# Patient Record
Sex: MALE | Race: WHITE | NOT HISPANIC OR LATINO
[De-identification: names, ages, dates, MRNs, and addresses within clinical notes are randomized per-mention and may not be internally consistent; named-entity substitution may affect disease eponyms.]

---

## 2017-07-04 ENCOUNTER — TRANSCRIPTION ENCOUNTER (OUTPATIENT)
Age: 62
End: 2017-07-04

## 2019-10-04 ENCOUNTER — APPOINTMENT (OUTPATIENT)
Dept: INTERNAL MEDICINE | Facility: CLINIC | Age: 64
End: 2019-10-04
Payer: MEDICAID

## 2019-10-04 VITALS
BODY MASS INDEX: 22.22 KG/M2 | SYSTOLIC BLOOD PRESSURE: 108 MMHG | HEIGHT: 69 IN | WEIGHT: 150 LBS | OXYGEN SATURATION: 97 % | DIASTOLIC BLOOD PRESSURE: 60 MMHG | HEART RATE: 60 BPM | RESPIRATION RATE: 16 BRPM | TEMPERATURE: 97.8 F

## 2019-10-04 DIAGNOSIS — K01.1 IMPACTED TEETH: ICD-10-CM

## 2019-10-04 DIAGNOSIS — B86 SCABIES: ICD-10-CM

## 2019-10-04 DIAGNOSIS — Z80.43 FAMILY HISTORY OF MALIGNANT NEOPLASM OF TESTIS: ICD-10-CM

## 2019-10-04 DIAGNOSIS — Z80.0 FAMILY HISTORY OF MALIGNANT NEOPLASM OF DIGESTIVE ORGANS: ICD-10-CM

## 2019-10-04 DIAGNOSIS — Z81.8 FAMILY HISTORY OF OTHER MENTAL AND BEHAVIORAL DISORDERS: ICD-10-CM

## 2019-10-04 PROCEDURE — 99204 OFFICE O/P NEW MOD 45 MIN: CPT

## 2019-10-04 NOTE — HISTORY OF PRESENT ILLNESS
[FreeTextEntry1] : Patient comes in to establish care.\par  [de-identified] : Patient is a 64-year-old male with history of hyperlipidemia, depression comes in to \[facility]\"" care. He hasn't noted a rash all over his body from his scalp down to his feet for the past one month. He states the rash started at the end of August and he is not sure where it began on his body. He states the rash is located with small bumps on his scalp, lesions on his chest, back, torso, arms and legs. Rash spares his face, neck and palms and soles of the feet. He did start a new medication fluoxetine at the end of August and only took 5 capsules. He discontinued this medication as well as ibuprofen and the rash persisted. He did also try a new type of fabric T-shirt in the middle of September and discontinued using this and the rash persisted. He does not note any other new medications, new foods, new soaps or detergents. Patient states the rash started prior to his travel and then he was in [place] for the past one month and returned on September 30. His rash is very itchy in nature and he has tried over-the-counter anti-itch and moisturizers without any relief. He has not seen any provider for his rash. He denies any associated fever or chills. No other family members with similar rash.\par He was previously following with a physician in [facility]. He then was laid off from his job and went through depression and is now following regularly with psychiatry, Palma Francis at [employer] [facility] and psychology, [facility].\par Patient denies any cp, sob,abdominal pain, nausea, vomiting, palpitations, fever, chills, constipation, diarrhea.\par

## 2019-10-04 NOTE — ASSESSMENT
[FreeTextEntry1] : 1.extensive rash: Likely related to scabies. Discussed treatment with permethrin cream topical. Medrol Dosepak for symptom relief. Went over instructions and side effects of medication.\par Referral for dermatology if symptoms do not improve.\par Discussed signs and symptoms to warrant urgent evaluation with patient.\par \par 2.hyperlipidemia: Recheck fasting lipids, previously on Lipitor 10 mg, stopped due to side effects of myalgia.\par \par 3.depression: Continue followup with psychiatry. Stopped taking fluoxetine due to rash.\par \par 4.health maintenance: Discussed fasting blood work, up-to-date with colonoscopy. Obtain previous records.

## 2019-10-07 ENCOUNTER — APPOINTMENT (OUTPATIENT)
Dept: DERMATOLOGY | Facility: CLINIC | Age: 64
End: 2019-10-07
Payer: MEDICAID

## 2019-10-07 PROCEDURE — 99203 OFFICE O/P NEW LOW 30 MIN: CPT

## 2019-10-07 NOTE — PHYSICAL EXAM
[FreeTextEntry3] : Skin examination performed of the face, neck, chest, hands, lower legs;\par The patient is well, alert and oriented, pleasant and cooperative.\par Eyelids, conjunctivae, oral mucosa, digits and nails all normal.  \par No cervical adenopathy.\par \par \par widespread erythematous excoriated papules;  symmetrically distributed over trunk, arms, legs, buttocks; \par hands, genital areas clear; \par no vesicles or bullae\par

## 2019-10-07 NOTE — ASSESSMENT
[FreeTextEntry1] : Dermal hypersensitivity reaction; \par bloodwork shows eosinophilia; \par doubt scabies, may have started with poison ivy which has now evolved into persistent reaction;\par Prednisone taper x 13 days\par TAC cream prn for any persistent itch\par f/u 1 month; t/c Bx if continues

## 2019-10-07 NOTE — HISTORY OF PRESENT ILLNESS
[de-identified] : Rash since August;  started early August, then went away for one month;\par Went to [facility] for 1 month;\par Placed on Medrol theresa by Dr. Dumont 3d ago; some improvement; also permethrin cream\par over trunks, arms, legs;\par feels better on po tx; \par

## 2019-10-09 LAB
ALBUMIN SERPL ELPH-MCNC: 4.6 G/DL
ALP BLD-CCNC: 123 U/L
ALT SERPL-CCNC: 16 U/L
ANION GAP SERPL CALC-SCNC: 14 MMOL/L
AST SERPL-CCNC: 18 U/L
BASOPHILS # BLD AUTO: 0.05 K/UL
BASOPHILS NFR BLD AUTO: 0.6 %
BILIRUB SERPL-MCNC: 0.8 MG/DL
BUN SERPL-MCNC: 13 MG/DL
CALCIUM SERPL-MCNC: 10.1 MG/DL
CHLORIDE SERPL-SCNC: 101 MMOL/L
CHOLEST SERPL-MCNC: 240 MG/DL
CHOLEST/HDLC SERPL: 6.2 RATIO
CO2 SERPL-SCNC: 26 MMOL/L
CREAT SERPL-MCNC: 0.88 MG/DL
EOSINOPHIL # BLD AUTO: 0.99 K/UL
EOSINOPHIL NFR BLD AUTO: 12.1 %
GLUCOSE SERPL-MCNC: 81 MG/DL
HCT VFR BLD CALC: 50.1 %
HDLC SERPL-MCNC: 39 MG/DL
HGB BLD-MCNC: 16.7 G/DL
IMM GRANULOCYTES NFR BLD AUTO: 0.6 %
LDLC SERPL CALC-MCNC: 167 MG/DL
LYMPHOCYTES # BLD AUTO: 0.92 K/UL
LYMPHOCYTES NFR BLD AUTO: 11.2 %
MAN DIFF?: NORMAL
MCHC RBC-ENTMCNC: 31.6 PG
MCHC RBC-ENTMCNC: 33.3 GM/DL
MCV RBC AUTO: 94.9 FL
MONOCYTES # BLD AUTO: 1.23 K/UL
MONOCYTES NFR BLD AUTO: 15 %
NEUTROPHILS # BLD AUTO: 4.97 K/UL
NEUTROPHILS NFR BLD AUTO: 60.5 %
PLATELET # BLD AUTO: 269 K/UL
POTASSIUM SERPL-SCNC: 5.1 MMOL/L
PROT SERPL-MCNC: 7 G/DL
RBC # BLD: 5.28 M/UL
RBC # FLD: 13.2 %
SODIUM SERPL-SCNC: 141 MMOL/L
TRIGL SERPL-MCNC: 172 MG/DL
TSH SERPL-ACNC: 2 UIU/ML
WBC # FLD AUTO: 8.21 K/UL

## 2019-11-04 ENCOUNTER — APPOINTMENT (OUTPATIENT)
Dept: DERMATOLOGY | Facility: CLINIC | Age: 64
End: 2019-11-04
Payer: MEDICAID

## 2019-11-04 DIAGNOSIS — L20.9 ATOPIC DERMATITIS, UNSPECIFIED: ICD-10-CM

## 2019-11-04 PROCEDURE — 99213 OFFICE O/P EST LOW 20 MIN: CPT

## 2019-11-08 PROBLEM — L20.9 ATOPIC DERMATITIS, UNSPECIFIED TYPE: Status: ACTIVE | Noted: 2019-11-08

## 2019-11-11 NOTE — HISTORY OF PRESENT ILLNESS
[de-identified] : f/u for check;  improved, not resolved;\par still with occasional itching, but less severe;\par has TAC cream

## 2019-11-11 NOTE — PHYSICAL EXAM
[FreeTextEntry3] : few excoriated papules;  upper chest, abdomen, few on back\par hands, arms clear;

## 2019-11-11 NOTE — ASSESSMENT
[FreeTextEntry1] : Off Prednisone for 2 weeks;  improved;  more consistent with asteatosis/Grovers\par Still some persistence, despite usage of triamcinolone, fluocinonide and mometasone\par \par tacrolimus ointment BID;  (Vivo) \par continue lubriderm;\par recent bloodwork unremarkable

## 2019-12-16 ENCOUNTER — APPOINTMENT (OUTPATIENT)
Dept: DERMATOLOGY | Facility: CLINIC | Age: 64
End: 2019-12-16
Payer: MEDICAID

## 2019-12-16 VITALS — WEIGHT: 150 LBS | HEIGHT: 69 IN | BODY MASS INDEX: 22.22 KG/M2

## 2019-12-16 PROCEDURE — 99213 OFFICE O/P EST LOW 20 MIN: CPT

## 2019-12-16 NOTE — PHYSICAL EXAM
[Full Body Skin Exam Performed] : performed [FreeTextEntry3] : + erythematous papules, many resolved;  upper trunk, upper abdomen, few on forearms\par back nearly clear;

## 2019-12-16 NOTE — HISTORY OF PRESENT ILLNESS
[de-identified] : f/u;  using tacrolimus, has triamcinolone; \par overall improved, but still has episodes of itching

## 2019-12-16 NOTE — ASSESSMENT
[FreeTextEntry1] : Papular derm/ prurigo; \par overall improved;\par use tacrolimus daily to prevent exacerbations;\par going to [place] for one month, should improve in warmer weather;\par f/u 6 wks

## 2020-02-03 ENCOUNTER — APPOINTMENT (OUTPATIENT)
Dept: DERMATOLOGY | Facility: CLINIC | Age: 65
End: 2020-02-03

## 2020-02-24 ENCOUNTER — FORM ENCOUNTER (OUTPATIENT)
Age: 65
End: 2020-02-24

## 2020-02-25 ENCOUNTER — OUTPATIENT (OUTPATIENT)
Dept: OUTPATIENT SERVICES | Facility: HOSPITAL | Age: 65
LOS: 1 days | End: 2020-02-25
Payer: MEDICAID

## 2020-02-25 ENCOUNTER — APPOINTMENT (OUTPATIENT)
Dept: RADIOLOGY | Facility: CLINIC | Age: 65
End: 2020-02-25
Payer: MEDICAID

## 2020-02-25 ENCOUNTER — APPOINTMENT (OUTPATIENT)
Dept: INTERNAL MEDICINE | Facility: CLINIC | Age: 65
End: 2020-02-25
Payer: MEDICAID

## 2020-02-25 VITALS
OXYGEN SATURATION: 96 % | RESPIRATION RATE: 16 BRPM | HEIGHT: 69 IN | WEIGHT: 160 LBS | BODY MASS INDEX: 23.7 KG/M2 | HEART RATE: 50 BPM | DIASTOLIC BLOOD PRESSURE: 76 MMHG | TEMPERATURE: 98.1 F | SYSTOLIC BLOOD PRESSURE: 132 MMHG

## 2020-02-25 DIAGNOSIS — Z00.8 ENCOUNTER FOR OTHER GENERAL EXAMINATION: ICD-10-CM

## 2020-02-25 PROCEDURE — 73030 X-RAY EXAM OF SHOULDER: CPT

## 2020-02-25 PROCEDURE — 99214 OFFICE O/P EST MOD 30 MIN: CPT

## 2020-02-25 PROCEDURE — 73030 X-RAY EXAM OF SHOULDER: CPT | Mod: 26,RT

## 2020-02-25 RX ORDER — PREDNISONE 20 MG/1
20 TABLET ORAL
Qty: 24 | Refills: 0 | Status: DISCONTINUED | COMMUNITY
Start: 2019-10-07 | End: 2020-02-25

## 2020-02-25 RX ORDER — FLUOXETINE HYDROCHLORIDE 10 MG/1
10 TABLET ORAL DAILY
Refills: 0 | Status: DISCONTINUED | COMMUNITY
End: 2020-02-25

## 2020-02-25 RX ORDER — TRIAMCINOLONE ACETONIDE 1 MG/G
0.1 CREAM TOPICAL
Qty: 1 | Refills: 3 | Status: DISCONTINUED | COMMUNITY
Start: 2019-10-07 | End: 2020-02-25

## 2020-02-25 RX ORDER — PERMETHRIN 50 MG/G
5 CREAM TOPICAL DAILY
Qty: 1 | Refills: 0 | Status: DISCONTINUED | COMMUNITY
Start: 2019-10-04 | End: 2020-02-25

## 2020-02-25 RX ORDER — METHYLPREDNISOLONE 4 MG/1
4 TABLET ORAL
Qty: 1 | Refills: 0 | Status: DISCONTINUED | COMMUNITY
Start: 2019-10-04 | End: 2020-02-25

## 2020-02-25 RX ORDER — TACROLIMUS 1 MG/G
0.1 OINTMENT TOPICAL TWICE DAILY
Qty: 1 | Refills: 3 | Status: DISCONTINUED | COMMUNITY
Start: 2019-11-04 | End: 2020-02-25

## 2020-02-25 NOTE — ASSESSMENT
[FreeTextEntry1] : 1.right shoulder pain: Likely tendon versus ligament injury. Will begin with a right shoulder x-ray. If negative patient will consider physical therapy. Does not wish to see orthopedics at this time.\par \par 2.depression: Follow up with psychiatry, patient self discontinued fluoxetine.

## 2020-02-25 NOTE — HISTORY OF PRESENT ILLNESS
[FreeTextEntry8] : Mr. GA HARO is a 64 year M who comes in for an acute visit.\par Patient is a 64-year-old male history of hyperlipidemia, depression comes in with complaints of right shoulder pain. He states he was in a spin class in early December and they did a lot of weights and after that he started having right shoulder pain especially when reaching overhead. The pain is so bad it wakes him from sleep and he is to take ibuprofen 200 mg at bedtime so that he can sleep through the night. He has avoided using weights but continues to do exercises well-healed. No other injury or trauma.\par He self discontinued his fluoxetine 10 mg in early December. He now seeing a new psychiatrist Dr. Escamilla and will follow up with them again as his depression symptoms have risen again.\par Patient denies any cp, sob,abdominal pain, nausea, vomiting, palpitations, fever, chills, constipation, diarrhea.\par

## 2020-03-20 ENCOUNTER — APPOINTMENT (OUTPATIENT)
Dept: INTERNAL MEDICINE | Facility: CLINIC | Age: 65
End: 2020-03-20

## 2020-03-20 DIAGNOSIS — Z87.09 PERSONAL HISTORY OF OTHER DISEASES OF THE RESPIRATORY SYSTEM: ICD-10-CM

## 2020-05-08 ENCOUNTER — APPOINTMENT (OUTPATIENT)
Dept: INTERNAL MEDICINE | Facility: CLINIC | Age: 65
End: 2020-05-08
Payer: MEDICAID

## 2020-05-08 VITALS
OXYGEN SATURATION: 99 % | HEIGHT: 69 IN | SYSTOLIC BLOOD PRESSURE: 110 MMHG | TEMPERATURE: 97.4 F | BODY MASS INDEX: 22.66 KG/M2 | HEART RATE: 45 BPM | DIASTOLIC BLOOD PRESSURE: 60 MMHG | WEIGHT: 153 LBS

## 2020-05-08 PROCEDURE — 99215 OFFICE O/P EST HI 40 MIN: CPT

## 2020-05-08 RX ORDER — OSELTAMIVIR PHOSPHATE 75 MG/1
75 CAPSULE ORAL TWICE DAILY
Qty: 10 | Refills: 0 | Status: DISCONTINUED | COMMUNITY
Start: 2020-03-20 | End: 2020-05-08

## 2020-05-11 NOTE — HISTORY OF PRESENT ILLNESS
[FreeTextEntry8] : Mr. GA HARO is a 64 year M who comes in for an acute visit.\par Pt has multiple complaints today. \par Pt has had lower abdominal pain for about 4-6 weeks b/l. He denies any nausea, vomiting, diarrhea or constipation. Has done more exercise and abdominal workout since Jan. \par Pt also continues to have worsening right shoulder pain. He takes ibuprofen and has tried Tylenol as well. This brings pain from 8/10 to 4/10 intensity. He has not done PT yet. \par He has had body aches and soreness all over for many weeks too. Doesn't feel as active as prior. \par PT has hx of cervical DDD on mri in past. Did PT in past about 1-2 yrs ago that did help. Currently when laying in bed feels numbness in fingers. \par PT had MRI brain done at [facility] and wants neuro follow up due to findings. No copy of MRi today. \par Patient denies any cp, sob,abdominal pain, nausea, vomiting, palpitations, fever, chills, constipation, diarrhea.\par

## 2020-05-11 NOTE — ASSESSMENT
[FreeTextEntry1] : 1.Lower abdominal pain: get usg of abdomen to r/o any pathology. if negative likely muscular. \par \par 2. Right shoulder pain: start meloxicam. Went over instructions and side effects of medication.\par Start PT.\par \par 3.Cervical DDD: start PT as well. \par \par 4.Abnormal MRI brain: f/u with neuro.

## 2020-05-12 LAB
25(OH)D3 SERPL-MCNC: 42.4 NG/ML
ALBUMIN SERPL ELPH-MCNC: 4.7 G/DL
ALP BLD-CCNC: 101 U/L
ALT SERPL-CCNC: 22 U/L
ANION GAP SERPL CALC-SCNC: 12 MMOL/L
APPEARANCE: CLEAR
AST SERPL-CCNC: 21 U/L
BACTERIA: NEGATIVE
BASOPHILS # BLD AUTO: 0.05 K/UL
BASOPHILS NFR BLD AUTO: 0.8 %
BILIRUB SERPL-MCNC: 0.6 MG/DL
BILIRUBIN URINE: NEGATIVE
BLOOD URINE: NEGATIVE
BUN SERPL-MCNC: 15 MG/DL
CALCIUM SERPL-MCNC: 9.5 MG/DL
CHLORIDE SERPL-SCNC: 102 MMOL/L
CHOLEST SERPL-MCNC: 234 MG/DL
CHOLEST/HDLC SERPL: 4.9 RATIO
CO2 SERPL-SCNC: 28 MMOL/L
COLOR: NORMAL
CREAT SERPL-MCNC: 0.88 MG/DL
EOSINOPHIL # BLD AUTO: 0.59 K/UL
EOSINOPHIL NFR BLD AUTO: 9.9 %
ESTIMATED AVERAGE GLUCOSE: 103 MG/DL
GLUCOSE QUALITATIVE U: NEGATIVE
GLUCOSE SERPL-MCNC: 96 MG/DL
HBA1C MFR BLD HPLC: 5.2 %
HCT VFR BLD CALC: 47.3 %
HDLC SERPL-MCNC: 48 MG/DL
HGB BLD-MCNC: 15.3 G/DL
HYALINE CASTS: 0 /LPF
IMM GRANULOCYTES NFR BLD AUTO: 0.2 %
KETONES URINE: NEGATIVE
LDLC SERPL CALC-MCNC: 171 MG/DL
LEUKOCYTE ESTERASE URINE: NEGATIVE
LYMPHOCYTES # BLD AUTO: 0.9 K/UL
LYMPHOCYTES NFR BLD AUTO: 15.1 %
MAN DIFF?: NORMAL
MCHC RBC-ENTMCNC: 31.1 PG
MCHC RBC-ENTMCNC: 32.3 GM/DL
MCV RBC AUTO: 96.1 FL
MICROSCOPIC-UA: NORMAL
MONOCYTES # BLD AUTO: 0.72 K/UL
MONOCYTES NFR BLD AUTO: 12 %
NEUTROPHILS # BLD AUTO: 3.71 K/UL
NEUTROPHILS NFR BLD AUTO: 62 %
NITRITE URINE: NEGATIVE
PH URINE: 7
PLATELET # BLD AUTO: 199 K/UL
POTASSIUM SERPL-SCNC: 4.5 MMOL/L
PROT SERPL-MCNC: 6.7 G/DL
PROTEIN URINE: NEGATIVE
RBC # BLD: 4.92 M/UL
RBC # FLD: 13.3 %
RED BLOOD CELLS URINE: 1 /HPF
SODIUM SERPL-SCNC: 142 MMOL/L
SPECIFIC GRAVITY URINE: 1.02
SQUAMOUS EPITHELIAL CELLS: 0 /HPF
TRIGL SERPL-MCNC: 79 MG/DL
TSH SERPL-ACNC: 2.48 UIU/ML
UROBILINOGEN URINE: NORMAL
VIT B12 SERPL-MCNC: 853 PG/ML
WBC # FLD AUTO: 5.98 K/UL
WHITE BLOOD CELLS URINE: 0 /HPF

## 2020-05-13 ENCOUNTER — OUTPATIENT (OUTPATIENT)
Dept: OUTPATIENT SERVICES | Facility: HOSPITAL | Age: 65
LOS: 1 days | End: 2020-05-13
Payer: MEDICAID

## 2020-05-13 ENCOUNTER — RESULT REVIEW (OUTPATIENT)
Age: 65
End: 2020-05-13

## 2020-05-13 ENCOUNTER — APPOINTMENT (OUTPATIENT)
Dept: ULTRASOUND IMAGING | Facility: CLINIC | Age: 65
End: 2020-05-13
Payer: MEDICAID

## 2020-05-13 DIAGNOSIS — R10.30 LOWER ABDOMINAL PAIN, UNSPECIFIED: ICD-10-CM

## 2020-05-13 PROCEDURE — 76857 US EXAM PELVIC LIMITED: CPT

## 2020-05-13 PROCEDURE — 76857 US EXAM PELVIC LIMITED: CPT | Mod: 26

## 2020-06-09 ENCOUNTER — APPOINTMENT (OUTPATIENT)
Dept: INTERNAL MEDICINE | Facility: CLINIC | Age: 65
End: 2020-06-09
Payer: MEDICAID

## 2020-06-09 VITALS
HEART RATE: 58 BPM | BODY MASS INDEX: 23.55 KG/M2 | DIASTOLIC BLOOD PRESSURE: 62 MMHG | OXYGEN SATURATION: 97 % | TEMPERATURE: 98.2 F | WEIGHT: 159 LBS | RESPIRATION RATE: 16 BRPM | SYSTOLIC BLOOD PRESSURE: 118 MMHG | HEIGHT: 69 IN

## 2020-06-09 DIAGNOSIS — R10.30 LOWER ABDOMINAL PAIN, UNSPECIFIED: ICD-10-CM

## 2020-06-09 DIAGNOSIS — M25.511 PAIN IN RIGHT SHOULDER: ICD-10-CM

## 2020-06-09 DIAGNOSIS — M47.812 SPONDYLOSIS W/OUT MYELOPATHY OR RADICULOPATHY, CERVICAL REGION: ICD-10-CM

## 2020-06-09 PROCEDURE — 99214 OFFICE O/P EST MOD 30 MIN: CPT

## 2020-06-09 NOTE — HISTORY OF PRESENT ILLNESS
[FreeTextEntry1] : GA HARO is a 65 year M who comes in for a follow up visit.\par  [de-identified] : Patient is a 65-year-old male coming in with complaints of continued right shoulder and neck pain as well as lower abdominal pain. Patient has been doing physical therapy for the past 4 weeks of his right shoulder and neck with only mild relief of his symptoms. He takes meloxicam 15 mg nightly to help her get to sleep due to the pain. For the last one week he's had numbness and tingling in his right hand first to third digit. He is out doing yoga and other exercises except for running. He also continues to have pain of the right shoulder radiating to the right arm. Right shoulder x-ray showed mild a.c. joint arthrosis.\par Patient stands when going on runs he continues to have bilateral lower abdominal soreness. Ultrasound of abdomen done was negative. Patient denies any weight loss, nausea vomiting, constipation or diarrhea.

## 2020-06-09 NOTE — ASSESSMENT
[FreeTextEntry1] : 1.right shoulder pain: Continue with physical therapy, meloxicam 15 mg at bedtime, refer to orthopedic surgery.\par \par 2.neck pain with cervical spondylosis: Last MRI of the cervical spine was in 2017, repeat cervical x-ray, refer to orthopedic spine surgery, continue with meloxicam.\par \par 3.I. lateral lower abdominal pain: Discuss getting CT abdomen pelvis, if negative will refer to GI.

## 2020-06-18 ENCOUNTER — APPOINTMENT (OUTPATIENT)
Dept: ORTHOPEDIC SURGERY | Facility: CLINIC | Age: 65
End: 2020-06-18
Payer: MEDICAID

## 2020-06-18 VITALS
HEIGHT: 69 IN | WEIGHT: 155 LBS | BODY MASS INDEX: 22.96 KG/M2 | DIASTOLIC BLOOD PRESSURE: 71 MMHG | SYSTOLIC BLOOD PRESSURE: 110 MMHG | TEMPERATURE: 97.3 F | HEART RATE: 53 BPM

## 2020-06-18 PROCEDURE — 99203 OFFICE O/P NEW LOW 30 MIN: CPT | Mod: 25

## 2020-06-18 PROCEDURE — 20610 DRAIN/INJ JOINT/BURSA W/O US: CPT | Mod: RT

## 2020-06-19 ENCOUNTER — APPOINTMENT (OUTPATIENT)
Dept: RADIOLOGY | Facility: CLINIC | Age: 65
End: 2020-06-19

## 2020-06-19 ENCOUNTER — APPOINTMENT (OUTPATIENT)
Dept: CT IMAGING | Facility: CLINIC | Age: 65
End: 2020-06-19

## 2020-06-19 DIAGNOSIS — Z87.39 PERSONAL HISTORY OF OTHER DISEASES OF THE MUSCULOSKELETAL SYSTEM AND CONNECTIVE TISSUE: ICD-10-CM

## 2020-06-19 DIAGNOSIS — Z86.39 PERSONAL HISTORY OF OTHER ENDOCRINE, NUTRITIONAL AND METABOLIC DISEASE: ICD-10-CM

## 2020-06-22 NOTE — DISCUSSION/SUMMARY
[de-identified] : At this time, I have recommended ice and elevation for the right shoulder impingement.  He will be reassessed in two weeks.

## 2020-06-22 NOTE — HISTORY OF PRESENT ILLNESS
[(no relief)  0] : the relief from treatment is 0/10 (no relief) [4] : the relief from medicine is 4/10 [5] : the relief from medicine is 5/10 [de-identified] : The patient comes in today with complaints of pain to his right shoulder.  It has been going on for several months, getting a little worse recently, especially with overhead activities.  This injury is not work related or due to an automobile accident.  The patient states the pain is intermittent and radiating.  The patient describes the pain as stabbing that radiates down arm and back.  The patient states standing up and medication makes the pain better while lying down makes the pain worse.\par \par Pain level includes a current pain level of 4/10.\par \par Ailment Interference:  \par Daily Livin/10\par Sleep:  10/10\par Enjoyment of Life:  8/10\par Climbing Stairs:  0/10\par Sports:  10/10\par Extra-Curricular Activities:  10/10 [] : No [de-identified] : Physical therapy  [de-identified] : Tylenol  [de-identified] : Ibuprofen

## 2020-06-22 NOTE — PHYSICAL EXAM
[de-identified] : Left Shoulder: \par Range of Motion in Degrees:   	\par    	                        Claimant:          Normal:  	\par Abduction (Active)  	180  	180 degrees  	\par Abduction (Passive)  	180  	180 degrees  	\par Forward elevation (Active):  	180  	180 degrees  	\par Forward elevation (Passive):  180  	180 degrees  	\par External rotation (Active):  	45  	45 degrees  	\par External rotation (Passive):  	45  	45 degrees  	\par Internal rotation (Active):  	L-1  	L-1  	\par Internal rotation (Passive):  	L-1  	L-1  	\par \par No motor weakness to internal rotation, external rotation or abduction in the scapular plane.  Negative crank test.  Negative O’Alejandro’s test.  Negative Speed’s test. Negative Yergason’s test.  Negative cross arm test.  No tenderness to palpation at the AC joint. Negative Hawkin’s sign.  Negative Neer’s sign.  Negative apprehension. Negative sulcus sign.  No gross neurological or vascular deficits distally.  2+ radial and ulnar pulses.  Skin is intact.  No rashes, scars or lesions.  No extra-articular swelling or tenderness. \par \par Right Shoulder:  \par Range of Motion in Degrees:	\par 	                                  Claimant:	Normal:	\par Abduction (Active)	                  180	               180 degrees	\par Abduction (Passive)	  180	               180 degrees	\par Forward elevation (Active):	  180	               180 degrees	\par Forward elevation (Passive):	  180	               180 degrees	\par External rotation (Active):	   45	               45 degrees	\par External rotation (Passive):	   45	               45 degrees	\par Internal rotation (Active):	   L-1	               L-1	\par Internal rotation (Passive):	   L-1	               L-1	\par  \par No motor weakness to internal rotation, external rotation or abduction in the scapular plane.  Negative crank test.  Negative O’Alejandro’s test.  Negative Speed’s test. Negative Yergason’s test.  Negative cross arm test.  No tenderness to palpation at the AC joint. Positive Hawkin’s sign.  Positive Neer’s sign. Negative apprehension. Negative sulcus sign.  No gross neurological or vascular deficits distally.  Skin is intact.  No rashes, scars or lesions. 2+ radial and ulnar pulses. No extra-articular swelling or tenderness.\par   [de-identified] : Ambulating with a normal gait.  Station:  Normal.  [de-identified] : General Appearance:  Well-developed, well-nourished male in no acute distress. \par  [de-identified] : Review of outside radiographs show no obvious osseous abnormalities.

## 2020-06-22 NOTE — REVIEW OF SYSTEMS
[Negative] : Heme/Lymph [FreeTextEntry9] : As noted in HPI  [de-identified] : Sleep disturbances [FreeTextEntry7] : Abdominal pain

## 2020-06-22 NOTE — ADDENDUM
[FreeTextEntry1] : This note was written by Jinny Hebert on 06/19/2020 acting as scribe for Samson Carrington III, MD

## 2020-06-22 NOTE — PROCEDURE
[de-identified] : Consent: \par At this time, I have recommended an injection to the right shoulder.  The risks and benefits of the procedure were discussed with the patient in detail.  Upon verbal consent of the patient, we proceeded with the injection as noted below.  \par \par Procedure:  \par After a sterile prep, the patient underwent an injection of 9 cc of 1% Lidocaine without epinephrine and 1 cc of Kenalog into the right shoulder.  The patient tolerated the procedure well.  There were no complications.

## 2020-06-25 ENCOUNTER — APPOINTMENT (OUTPATIENT)
Dept: CT IMAGING | Facility: CLINIC | Age: 65
End: 2020-06-25
Payer: MEDICAID

## 2020-06-25 ENCOUNTER — OUTPATIENT (OUTPATIENT)
Dept: OUTPATIENT SERVICES | Facility: HOSPITAL | Age: 65
LOS: 1 days | End: 2020-06-25
Payer: MEDICAID

## 2020-06-25 DIAGNOSIS — Z00.8 ENCOUNTER FOR OTHER GENERAL EXAMINATION: ICD-10-CM

## 2020-06-25 PROCEDURE — 74176 CT ABD & PELVIS W/O CONTRAST: CPT | Mod: 26

## 2020-06-25 PROCEDURE — 74176 CT ABD & PELVIS W/O CONTRAST: CPT

## 2020-07-02 ENCOUNTER — APPOINTMENT (OUTPATIENT)
Dept: ORTHOPEDIC SURGERY | Facility: CLINIC | Age: 65
End: 2020-07-02
Payer: MEDICAID

## 2020-07-02 PROCEDURE — 99213 OFFICE O/P EST LOW 20 MIN: CPT | Mod: 95

## 2020-07-07 NOTE — ADDENDUM
[FreeTextEntry1] : This note was written by Christine Andres on 07/07/2020 acting as a scribe for TATIANA DEAN III, MD

## 2020-07-07 NOTE — DISCUSSION/SUMMARY
[de-identified] : At this time, due to impingement syndrome of the right shoulder, I recommend he start on a course of physical therapy.  He will be reassessed in one month.\par \par Of note, the consultation lasted approximately 10 minutes.

## 2020-07-07 NOTE — HISTORY OF PRESENT ILLNESS
[de-identified] : The reason for the Telehealth encounter was a follow up visit for his right shoulder.\par \par This visit was provided via Telehealth using real-time 2-way audio visual technology.  The patient, GA HARO, was located at home, [address] [place] [address], at the time of the visit.  The provider, Dr. Samson Carrington, was located at his office at [address] [place] [address] at the time of the visit.  The patient, GA HARO, participated in the Telehealth encounter. Verbal consent was given on 07/02/2020 at 11:00AM by the patient, GA HARO, self.\par \par Ga states that since his injections, he is feeling a lot better.  He still has some pain with overhead activities, but overall, he said he is much improved.

## 2020-07-07 NOTE — PHYSICAL EXAM
[de-identified] : Physical examination was performed via [employer]. \par \par Right Shoulder:  \par Range of Motion in Degrees:	\par 	                                  Claimant:	Normal:	\par Abduction (Active)	                  180	               180 degrees	\par Abduction (Passive)	  180	               180 degrees	\par Forward elevation (Active):	  180	               180 degrees	\par Forward elevation (Passive):	  180	               180 degrees	\par External rotation (Active):	   45	               45 degrees	\par External rotation (Passive):	   45	               45 degrees	\par Internal rotation (Active):	   L-1	               L-1	\par Internal rotation (Passive):	   L-1	               L-1	\par  \par No motor weakness to internal rotation, external rotation or abduction in the scapular plane.  Negative crank test.  Negative O’Alejandro’s test.  Negative Speed’s test. Negative Yergason’s test.  Negative cross arm test.  No tenderness to palpation at the AC joint. Positive Hawkin’s sign.  Positive Neer’s sign. Negative apprehension. Negative sulcus sign.  No gross neurological or vascular deficits distally.  Skin is intact.  No rashes, scars or lesions. 2+ radial and ulnar pulses. No extra-articular swelling or tenderness.\par   [de-identified] : Ambulating with a normal gait.  Station:  Normal.  [de-identified] : Appearance:  Well-developed, well-nourished male in no acute distress.\par

## 2020-09-17 ENCOUNTER — TRANSCRIPTION ENCOUNTER (OUTPATIENT)
Age: 65
End: 2020-09-17

## 2020-09-17 ENCOUNTER — APPOINTMENT (OUTPATIENT)
Dept: ORTHOPEDIC SURGERY | Facility: CLINIC | Age: 65
End: 2020-09-17
Payer: MEDICAID

## 2020-09-17 PROCEDURE — 99213 OFFICE O/P EST LOW 20 MIN: CPT | Mod: 95

## 2020-09-21 NOTE — PHYSICAL EXAM
[de-identified] : Physical examination was performed via [employer]. \par \par Right Shoulder: \par Range of Motion in Degrees:   	\par    	                        Claimant:          Normal:  	\par Abduction (Active)  	180  	180 degrees  	\par Abduction (Passive)  	180  	180 degrees  	\par Forward elevation (Active):  	180  	180 degrees  	\par Forward elevation (Passive):  180  	180 degrees  	\par External rotation (Active):  	45  	45 degrees  	\par External rotation (Passive):  	45  	45 degrees  	\par Internal rotation (Active):  	L-1  	L-1  	\par Internal rotation (Passive):  	L-1  	L-1  	\par \par No motor weakness to internal rotation, external rotation or abduction in the scapular plane.  Negative crank test.  Negative O’Alejandro’s test.  Negative Speed’s test. Negative Yergason’s test.  Negative cross arm test.  No tenderness to palpation at the AC joint. Negative Hawkin’s sign.  Negative Neer’s sign.  Negative apprehension. Negative sulcus sign.  No gross neurological or vascular deficits distally.  Skin is intact.  No rashes, scars or lesions. No extra-articular swelling or tenderness. \par  [de-identified] : Ambulating with a normal gait. Station:  Normal.  [de-identified] : Appearance:  Well-developed, well-nourished male in no acute distress.\par

## 2020-09-21 NOTE — DISCUSSION/SUMMARY
[de-identified] : At this time, I believe he resolved his impingement syndrome of his right shoulder, but he is left with cervical radiculopathy.  At this time, he is referred for a spine evaluation.\par \par Of note, the consultation lasted approximately 10 minutes.

## 2020-09-21 NOTE — HISTORY OF PRESENT ILLNESS
[de-identified] : The reason for the Telehealth encounter was a follow up visit for his right shoulder.\par \par This visit was provided via Telehealth using real-time 2-way audio visual technology.  The patient, GA HARO, was located at home, [address] [place] [address], at the time of the visit.  The provider, Dr. Samson Carrington, was located at his office at [address] [place] [address] at the time of the visit.  The patient, GA HARO, participated in the Telehealth encounter. Verbal consent was given on 09/17/2020 at  8:30AM by the patient, GA HARO, self.\par \par The patient states the injection did great, but he is having some persistent complaints to his right shoulder.  He describes his complaints as numbness, tinging and burning down his arm into his hand.

## 2020-09-21 NOTE — ADDENDUM
[FreeTextEntry1] : This note was written by Christine Andres on 09/21/2020 acting as a scribe for TATIANA DEAN III, MD

## 2020-10-05 ENCOUNTER — APPOINTMENT (OUTPATIENT)
Dept: NEUROSURGERY | Facility: CLINIC | Age: 65
End: 2020-10-05
Payer: MEDICAID

## 2020-10-05 VITALS
HEIGHT: 69 IN | HEART RATE: 49 BPM | WEIGHT: 165 LBS | SYSTOLIC BLOOD PRESSURE: 115 MMHG | BODY MASS INDEX: 24.44 KG/M2 | OXYGEN SATURATION: 98 % | DIASTOLIC BLOOD PRESSURE: 72 MMHG | TEMPERATURE: 97.3 F

## 2020-10-05 DIAGNOSIS — M54.2 CERVICALGIA: ICD-10-CM

## 2020-10-05 DIAGNOSIS — M79.603 PAIN IN ARM, UNSPECIFIED: ICD-10-CM

## 2020-10-05 PROCEDURE — 99202 OFFICE O/P NEW SF 15 MIN: CPT

## 2020-10-05 RX ORDER — MELOXICAM 15 MG/1
15 TABLET ORAL
Qty: 30 | Refills: 2 | Status: DISCONTINUED | COMMUNITY
Start: 2020-05-13 | End: 2020-10-05

## 2020-10-05 NOTE — CONSULT LETTER
[Dear  ___] : Dear  [unfilled], [Courtesy Letter:] : I had the pleasure of seeing your patient, [unfilled], in my office today. [Sincerely,] : Sincerely, [FreeTextEntry2] : Kirstin Dumont MD\par [address]\[place] [address] [FreeTextEntry1] : Reed Brice is a 65-year-old male who presents today with cervical symptoms.  Patient reports symptoms started 4 years ago with no specific event.  Patient reports constant 2/10 neck ache.  Patient reports 5–7/10 shooting pain radiating from his hands up to his arms and on occasion that shooting pains radiate down to his hands, right side is worse than left.  Patient reports numbness and tingling to all fingers, right greater than left.  He denies arm weakness.  He reports burning sensation to both hands.  He denies any difficulties with walking.  Patient states symptoms are worse when lying down at night.  Sitting up straight relieves some of his pain.  He reports dropping objects from his hands and difficulties with dexterity.\par \par Patient was referred to us by his orthopedic surgeon.  He was given a cortisone injection in his right shoulder approximately 1 month ago which provided him with some relief.  Patient was also prescribed meloxicam for the hand pain by his primary care physician which provided him with some relief.  Patient underwent physical therapy in 2017 and again February of 2020 for approximately 6 weeks.  Patient states it  he had to stop physical therapy due to COVID however has resumed.  Patient takes ibuprofen as needed with some relief noted.\par \par There is no imaging to review with the patient.  Patient reports he has a history of Cervical herniations.\par \par Patient is alert and oriented.  No distress noted.  Strength to bilateral upper extremities 5/5.  Sensation to temperature and pinprick to bilateral arms equal and normal.  Negative Sarabjit's.  Reflexes to bilateral arms present and equal.  Good capillary refill noted to bilateral hands.  Negative Phalen's.  Tinel's noted on the left side.  Negative Wartenberg's.  Negative Froment's.  Patient walking without difficulties.\par \par I provided the patient with a prescription for an x-ray and MRI of the cervical spine.  We will follow-up in office once imaging is completed.  Patient is aware to call at anytime with any further questions or concerns or with any new or worsening symptoms. [FreeTextEntry3] : Lisa Munoz, MSN, FNP-BC\par Nurse Practitioner\par Neurosurgery\par [address] [place] [address], [address]nd floor \[place] [address] \par Office: [phone] \par Fax: [phone]\par \par

## 2020-10-06 ENCOUNTER — APPOINTMENT (OUTPATIENT)
Dept: RADIOLOGY | Facility: CLINIC | Age: 65
End: 2020-10-06
Payer: MEDICAID

## 2020-10-06 ENCOUNTER — OUTPATIENT (OUTPATIENT)
Dept: OUTPATIENT SERVICES | Facility: HOSPITAL | Age: 65
LOS: 1 days | End: 2020-10-06
Payer: MEDICAID

## 2020-10-06 DIAGNOSIS — M54.2 CERVICALGIA: ICD-10-CM

## 2020-10-06 PROCEDURE — 72050 X-RAY EXAM NECK SPINE 4/5VWS: CPT

## 2020-10-06 PROCEDURE — 72050 X-RAY EXAM NECK SPINE 4/5VWS: CPT | Mod: 26

## 2020-10-20 ENCOUNTER — OUTPATIENT (OUTPATIENT)
Dept: OUTPATIENT SERVICES | Facility: HOSPITAL | Age: 65
LOS: 1 days | End: 2020-10-20
Payer: MEDICAID

## 2020-10-20 ENCOUNTER — APPOINTMENT (OUTPATIENT)
Dept: MRI IMAGING | Facility: CLINIC | Age: 65
End: 2020-10-20
Payer: MEDICAID

## 2020-10-20 DIAGNOSIS — M54.2 CERVICALGIA: ICD-10-CM

## 2020-10-20 DIAGNOSIS — M79.603 PAIN IN ARM, UNSPECIFIED: ICD-10-CM

## 2020-10-20 DIAGNOSIS — R20.2 PARESTHESIA OF SKIN: ICD-10-CM

## 2020-10-20 PROCEDURE — 72141 MRI NECK SPINE W/O DYE: CPT

## 2020-10-20 PROCEDURE — 72141 MRI NECK SPINE W/O DYE: CPT | Mod: 26

## 2020-10-29 ENCOUNTER — APPOINTMENT (OUTPATIENT)
Dept: INTERNAL MEDICINE | Facility: CLINIC | Age: 65
End: 2020-10-29
Payer: MEDICAID

## 2020-10-29 PROCEDURE — G0008: CPT

## 2020-10-29 PROCEDURE — 99072 ADDL SUPL MATRL&STAF TM PHE: CPT

## 2020-10-29 PROCEDURE — 90686 IIV4 VACC NO PRSV 0.5 ML IM: CPT

## 2020-11-05 ENCOUNTER — APPOINTMENT (OUTPATIENT)
Dept: NEUROSURGERY | Facility: CLINIC | Age: 65
End: 2020-11-05
Payer: MEDICAID

## 2020-11-05 VITALS
TEMPERATURE: 97.9 F | HEART RATE: 58 BPM | OXYGEN SATURATION: 98 % | SYSTOLIC BLOOD PRESSURE: 121 MMHG | BODY MASS INDEX: 24.44 KG/M2 | WEIGHT: 165 LBS | HEIGHT: 69 IN | DIASTOLIC BLOOD PRESSURE: 72 MMHG

## 2020-11-05 PROCEDURE — 99214 OFFICE O/P EST MOD 30 MIN: CPT

## 2020-11-05 PROCEDURE — 99072 ADDL SUPL MATRL&STAF TM PHE: CPT

## 2020-11-05 NOTE — CONSULT LETTER
[Dear  ___] : Dear  [unfilled], [Courtesy Letter:] : I had the pleasure of seeing your patient, [unfilled], in my office today. [Sincerely,] : Sincerely, [FreeTextEntry2] : Kirstin Dumont MD\par [address]\[place] [address] \par  [FreeTextEntry1] : Mr. Brice is a very pleasant 65-year-old male patient who was seen in our office today in follow-up.  The patient returned today to review his imaging findings.\par \par Overall, the patient states that his symptoms have not changed significantly.  The patient complains primarily of numbness which becomes burning pain in the hands bilaterally.  The patient states that these symptoms tend to be worse at night but are not present constantly.  The patient has very minimal neck pain rated at a 2/10 in severity for which she is managing well with conservative therapy.  The patient states that occasionally the pain radiates up his forearm and  very rarely moves above the elbow.  The patient's symptoms are significantly worse on the right hand compared to the left.\par \par On examination, the patient is alert, oriented, and compliant with the exam.  The patient demonstrates 5/5 strength in the upper and lower extremities bilaterally.  The patient demonstrates 2+ reflexes bilaterally. Percussion of the carpal tunnel does not elicit any symptoms, Phalen's test is negative.\par \par The patient is accompanied with an MRI scan performed on October 10, 2020 of the neck which demonstrates moderate degenerative changes at C5/6 and C6/7.  These images demonstrate foraminal stenosis bilaterally without nerve root or spinal cord impingement.\par \par Taken together, the patient has a clinical history very classic for bilateral carpal tunnel syndrome.  I have reassured the patient that the MRI findings in his cervical spine  are minimal and unlikely causing his current symptoms  I have provided the patient with bilateral splints to wear at night, but have recommended EMG/nerve conduction studies for a definitive diagnosis.  The patient is to follow-up with us once these electrophysiologic studies are complete so that we can review his findings together.\par \par  [FreeTextEntry3] : Tomasz Box MD, PhD, FRCPSC \par Attending Neurosurgeon \par [facility] \par [address]nd floor \[place] [address] \par Office: [phone] \par Fax: [phone]\par \par

## 2020-12-04 ENCOUNTER — APPOINTMENT (OUTPATIENT)
Dept: NEUROLOGY | Facility: CLINIC | Age: 65
End: 2020-12-04
Payer: MEDICAID

## 2020-12-04 PROCEDURE — 99072 ADDL SUPL MATRL&STAF TM PHE: CPT

## 2020-12-04 PROCEDURE — 95910 NRV CNDJ TEST 7-8 STUDIES: CPT

## 2020-12-04 PROCEDURE — 95885 MUSC TST DONE W/NERV TST LIM: CPT

## 2020-12-04 NOTE — PHYSICAL EXAM
[General Appearance - Alert] : alert [General Appearance - In No Acute Distress] : in no acute distress [Oriented To Time, Place, And Person] : oriented to person, place, and time [Impaired Insight] : insight and judgment were intact [Affect] : the affect was normal [Person] : oriented to person [Place] : oriented to place [Time] : oriented to time [Visual Intact] : visual attention was ~T not ~L decreased [Naming Objects] : no difficulty naming common objects [Writing A Sentence] : no difficulty writing a sentence [Fluency] : fluency intact [Comprehension] : comprehension intact [Reading] : reading intact [Past History] : adequate knowledge of personal past history [Cranial Nerves Optic (II)] : visual acuity intact bilaterally,  visual fields full to confrontation, pupils equal round and reactive to light [Cranial Nerves Oculomotor (III)] : extraocular motion intact [Cranial Nerves Trigeminal (V)] : facial sensation intact symmetrically [Cranial Nerves Facial (VII)] : face symmetrical [Cranial Nerves Vestibulocochlear (VIII)] : hearing was intact bilaterally [Cranial Nerves Glossopharyngeal (IX)] : tongue and palate midline [Cranial Nerves Accessory (XI - Cranial And Spinal)] : head turning and shoulder shrug symmetric [Cranial Nerves Hypoglossal (XII)] : there was no tongue deviation with protrusion [Motor Tone] : muscle tone was normal in all four extremities [Motor Strength] : muscle strength was normal in all four extremities [Involuntary Movements] : no involuntary movements were seen [No Muscle Atrophy] : normal bulk in all four extremities [Motor Handedness Right-Handed] : the patient is right hand dominant [Sensation Tactile Decrease] : light touch was intact [Abnormal Walk] : normal gait [Balance] : balance was intact [2+] : Biceps left 2+ [Past-pointing] : there was no past-pointing [Tremor] : no tremor present

## 2020-12-04 NOTE — DISCUSSION/SUMMARY
[FreeTextEntry1] : 65-year-old man complaining of bilateral hand pain and numbness, electrophysiologic evidence of moderate carpal tunnel syndrome.\par Patient made aware of study findings. I advised to followup with hand clinic, for possible bilateral carpal tunnel release surgery.

## 2020-12-04 NOTE — HISTORY OF PRESENT ILLNESS
[FreeTextEntry1] : 65-year-old man right-handed complaining of several months of pain, numbness sensations of both hands, worse at night. History of recurrent neck pain, vital or by neurosurgery, recent MRI of the cervical spine read as multilevel degenerative changes, moderate to severe left neural foraminal stenosis at C5/C6.

## 2020-12-04 NOTE — PROCEDURE
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

## 2020-12-22 ENCOUNTER — APPOINTMENT (OUTPATIENT)
Dept: NEUROSURGERY | Facility: CLINIC | Age: 65
End: 2020-12-22
Payer: MEDICAID

## 2020-12-22 VITALS
DIASTOLIC BLOOD PRESSURE: 73 MMHG | HEART RATE: 59 BPM | SYSTOLIC BLOOD PRESSURE: 115 MMHG | BODY MASS INDEX: 23.99 KG/M2 | OXYGEN SATURATION: 98 % | TEMPERATURE: 97.9 F | HEIGHT: 69 IN | WEIGHT: 162 LBS

## 2020-12-22 DIAGNOSIS — R20.2 PARESTHESIA OF SKIN: ICD-10-CM

## 2020-12-22 DIAGNOSIS — M54.12 RADICULOPATHY, CERVICAL REGION: ICD-10-CM

## 2020-12-22 PROCEDURE — 99072 ADDL SUPL MATRL&STAF TM PHE: CPT

## 2020-12-22 PROCEDURE — 99213 OFFICE O/P EST LOW 20 MIN: CPT

## 2020-12-22 NOTE — CONSULT LETTER
[Dear  ___] : Dear  [unfilled], [FreeTextEntry2] : Kirstin Dumont MD\par [address]\par [place] [address] \par  \par  [FreeTextEntry1] : Mr. Brice Is a very pleasant 65-year-old male patient who was seen today in follow-up in regards to bilateral upper extremity symptoms.\par \par The patient was previously assessed for bilateral hand numbness and burning which is worse with activity.  The patient had an MRI scan of the cervical spine performed on October 10, 2020 which demonstrated moderate degenerative changes at C5-C7.  The patient's physical and clinical history was more consistent with carpal tunnel syndrome and the patient was sent for electrophysiologic studies which were reviewed today.  At this time, the patient states that his symptoms have worsened and the  wrist brace is provided did not improve his symptoms at all.\par \par On examination, the patient is alert, oriented, and compliant with the exam.  The patient demonstrates 5/5 strength in the upper and lower extremities bilaterally.  The patient demonstrates 2+ reflexes bilaterally.  Percussion of the carpal tunnel does not elicit any symptoms.  Phalen's test is negative.\par \par I have no new imaging to review today.  The patient has an EMG/nerve conduction study performed on December 4, 2020 which demonstrates bilateral carpal tunnel syndrome.\par \par Taken together, the patient has a clinical history and electrophysiologic findings most consistent with bilateral carpal tunnel syndrome.  At this time, the patient would like to consider surgical intervention and thus we have recommended a hand specialist.  The patient is aware that he may contact our office anytime should the need arise, but does not need regularly scheduled follow-up.

## 2020-12-23 PROBLEM — Z87.09 HISTORY OF SORE THROAT: Status: RESOLVED | Noted: 2020-03-20 | Resolved: 2020-12-23

## 2021-01-05 ENCOUNTER — APPOINTMENT (OUTPATIENT)
Dept: ORTHOPEDIC SURGERY | Facility: CLINIC | Age: 66
End: 2021-01-05
Payer: MEDICAID

## 2021-01-05 PROCEDURE — 99214 OFFICE O/P EST MOD 30 MIN: CPT

## 2021-01-05 PROCEDURE — 99072 ADDL SUPL MATRL&STAF TM PHE: CPT

## 2021-01-05 NOTE — PHYSICAL EXAM
[de-identified] : Physical exam shows the patient to be alert and oriented x3, capable of ambulation. The patient is well-developed and well-nourished in no apparent respiratory distress. Majority of the skin is intact bilaterally in the upper extremities without lymphadenopathy at the elbows.\par \par There is a negative Spurling test. There is no sensitivity or Tinel's over the axilla bilaterally in the area of the brachial plexus.\par \par The elbows have a symmetric and full range of motion with no pain upon forced flexion or extension bilaterally. There is no tenderness over the medial or lateral epicondyles bilaterally. The biceps and triceps are intact bilaterally with 5 over 5 strength. There is no joint effusion or instability of the medial and lateral collateral ligament complex bilaterally. There is no sensitivity of the median ulnar or radial nerves with provocative tests bilaterally.\par \par The wrists have a symmetric range of motion bilaterally. There is no tenderness over the scaphoid, scapholunate or lunotriquetral ligaments bilaterally. There is a negative Oliva test bilaterally. There is negative tenderness over the radial ulnar joint or TFCC and no evidence of instability bilaterally. No tenderness over the pisotriquetral or hamate hook or CMC joint bilaterally. There is 5 over 5 strength of the wrists bilaterally.\par \par There is a negative Finkelstein test bilaterally and no tenderness over the A1 pulleys. There is no tenderness or instability of the MP PIP or DIP joints in the digits bilaterally with no evidence of digital malrotation.\par \par There is no sensitivity or masses around the ulnar nerve at the level of the wrist bilaterally.\par \par \par There is 2+ pulses over the radial arteries bilaterally with good capillary refill.\par \par There is a positive Tinel's and a positive Phalen's test at 15 seconds bilaterally. There is no thenar atrophy, good opposition is present. The remaining intrinsic musculature has good strength bilaterally.\par \par Sensation is intact in the median nerve distribution, 2 point discrimination 6 mm.\par

## 2021-01-05 NOTE — HISTORY OF PRESENT ILLNESS
[FreeTextEntry1] : 65-year-old male presents for evaluation of bilateral hand paresthesias. His symptoms have been present for over one year and occur on a daily and nightly basis. He recently had an EMG which demonstrates bilateral carpal tunnel syndrome, he is interested in surgical intervention.

## 2021-01-05 NOTE — ASSESSMENT
[FreeTextEntry1] : 65-year-old male with bilateral hand paresthesias consistent with carpal tunnel syndrome confirmed with EMG. Risks and benefits of continued conservative treatment versus surgical intervention for carpal tunnel release were discussed at length the patient he would like to proceed with surgery on the right side first. He'll be booked for a right carpal tunnel release and may continue activity as tolerated until that time.

## 2021-01-25 ENCOUNTER — APPOINTMENT (OUTPATIENT)
Dept: DISASTER EMERGENCY | Facility: CLINIC | Age: 66
End: 2021-01-25

## 2021-01-26 LAB — SARS-COV-2 N GENE NPH QL NAA+PROBE: NOT DETECTED

## 2021-01-28 ENCOUNTER — APPOINTMENT (OUTPATIENT)
Dept: ORTHOPEDIC SURGERY | Facility: HOSPITAL | Age: 66
End: 2021-01-28

## 2021-01-28 ENCOUNTER — OUTPATIENT (OUTPATIENT)
Dept: INPATIENT UNIT | Facility: HOSPITAL | Age: 66
LOS: 1 days | Discharge: HOME | End: 2021-01-28
Payer: MEDICAID

## 2021-01-28 VITALS
TEMPERATURE: 98 F | SYSTOLIC BLOOD PRESSURE: 105 MMHG | HEART RATE: 50 BPM | RESPIRATION RATE: 16 BRPM | OXYGEN SATURATION: 100 % | DIASTOLIC BLOOD PRESSURE: 59 MMHG

## 2021-01-28 VITALS
OXYGEN SATURATION: 99 % | DIASTOLIC BLOOD PRESSURE: 78 MMHG | SYSTOLIC BLOOD PRESSURE: 106 MMHG | HEIGHT: 69 IN | TEMPERATURE: 98 F | HEART RATE: 50 BPM | WEIGHT: 154.32 LBS | RESPIRATION RATE: 16 BRPM

## 2021-01-28 DIAGNOSIS — G56.03 CARPAL TUNNEL SYNDROME, BILATERAL UPPER LIMBS: ICD-10-CM

## 2021-01-28 DIAGNOSIS — Z98.890 OTHER SPECIFIED POSTPROCEDURAL STATES: Chronic | ICD-10-CM

## 2021-01-28 DIAGNOSIS — Z90.89 ACQUIRED ABSENCE OF OTHER ORGANS: Chronic | ICD-10-CM

## 2021-01-28 PROCEDURE — 64721 CARPAL TUNNEL SURGERY: CPT | Mod: RT

## 2021-01-28 NOTE — ASU PATIENT PROFILE, ADULT - PSH
H/O arthroscopy of right knee  torn meniscus  H/O hernia repair    History of surgery  lasik  History of tonsillectomy

## 2021-01-28 NOTE — ASU PATIENT PROFILE, ADULT - PMH
Arthritis    Cervical radiculopathy    SOB (shortness of breath)  pt states throat closes if he lays back and head is back when he is sleeping

## 2021-01-28 NOTE — ASU DISCHARGE PLAN (ADULT/PEDIATRIC) - CARE PROVIDER_API CALL
Blanquita Mayo)  [facility]  [address] [place] [address]  Phone: [phone]  Fax: [phone]  Follow Up Time:

## 2021-01-28 NOTE — ASU DISCHARGE PLAN (ADULT/PEDIATRIC) - ASU DC SPECIAL INSTRUCTIONSFT
Follow up with Dr Mayo in 7-10 days. Call office for appointment. Take medications as prescribed. Keep dressing clean, dry, and intact. May remove dressing in 3 days, do not remove steristrips. Rest, ice, and elevate affected extremity.  No weight bearing right hand, light range of motion as tolerated.

## 2021-02-03 ENCOUNTER — APPOINTMENT (OUTPATIENT)
Dept: ORTHOPEDIC SURGERY | Facility: CLINIC | Age: 66
End: 2021-02-03
Payer: MEDICAID

## 2021-02-03 VITALS
DIASTOLIC BLOOD PRESSURE: 54 MMHG | HEIGHT: 69 IN | WEIGHT: 162 LBS | HEART RATE: 50 BPM | BODY MASS INDEX: 23.99 KG/M2 | SYSTOLIC BLOOD PRESSURE: 95 MMHG

## 2021-02-03 PROCEDURE — 99024 POSTOP FOLLOW-UP VISIT: CPT

## 2021-02-03 NOTE — HISTORY OF PRESENT ILLNESS
[Doing Well] : is doing well [Excellent Pain Control] : has excellent pain control [No Sign of Infection] : is showing no signs of infection [Clean/Dry/Intact] : clean, dry and intact [Swelling] : swollen [Neuro Intact] : an unremarkable neurological exam [Vascular Intact] : ~T peripheral vascular exam normal [Chills] : no chills [Fever] : no fever [Nausea] : no nausea [Vomiting] : no vomiting [Erythema] : not erythematous [Dehiscence] : not dehisced [de-identified] : 65 year old male presents s/p right carpal tunnel release, doing well. He reports mild soreness along the incision. He reports resolution of the paraesthesias following surgery [de-identified] : Procedure: Right carpal tunnel release\par DOS: 1/28/21 [de-identified] : Right Wrist Exam\par \par Skin is intact with a well-healing incision to the volar base of the hand. There is no evidence of infection. Wrist and digit range of motion is intact with flexion and extension, but slightly diminished secondary to stiffness. Sensation is grossly intact and capillary refill is brisk.\par  [de-identified] : no imaging [de-identified] : s/p right carpal tunnel release, one week post operative. \par At this time the patient has been instructed to refrain from any heavy activity for the next week. He will slowly begin return to normal activity at two weeks post operative. He has been instructed to work on range of motion exercises as well. Concerning the incision, he will keep it clean and dry for the next week or so as the skin heals. He may apply vitamin E oil and begin scar massage starting at the 2 week ely. He will follow up PRN

## 2021-02-04 DIAGNOSIS — G56.01 CARPAL TUNNEL SYNDROME, RIGHT UPPER LIMB: ICD-10-CM

## 2021-02-04 DIAGNOSIS — E78.5 HYPERLIPIDEMIA, UNSPECIFIED: ICD-10-CM

## 2021-02-04 DIAGNOSIS — G56.03 CARPAL TUNNEL SYNDROME, BILATERAL UPPER LIMBS: ICD-10-CM

## 2021-02-04 DIAGNOSIS — M47.22 OTHER SPONDYLOSIS WITH RADICULOPATHY, CERVICAL REGION: ICD-10-CM

## 2021-02-04 DIAGNOSIS — F32.5 MAJOR DEPRESSIVE DISORDER, SINGLE EPISODE, IN FULL REMISSION: ICD-10-CM

## 2021-02-15 ENCOUNTER — APPOINTMENT (OUTPATIENT)
Dept: DISASTER EMERGENCY | Facility: CLINIC | Age: 66
End: 2021-02-15

## 2021-02-15 LAB — SARS-COV-2 N GENE NPH QL NAA+PROBE: NOT DETECTED

## 2021-02-18 ENCOUNTER — APPOINTMENT (OUTPATIENT)
Dept: ORTHOPEDIC SURGERY | Facility: HOSPITAL | Age: 66
End: 2021-02-18

## 2021-02-18 ENCOUNTER — OUTPATIENT (OUTPATIENT)
Dept: INPATIENT UNIT | Facility: HOSPITAL | Age: 66
LOS: 1 days | Discharge: HOME | End: 2021-02-18
Payer: MEDICAID

## 2021-02-18 VITALS
DIASTOLIC BLOOD PRESSURE: 62 MMHG | RESPIRATION RATE: 15 BRPM | SYSTOLIC BLOOD PRESSURE: 90 MMHG | OXYGEN SATURATION: 96 % | TEMPERATURE: 98 F | HEART RATE: 58 BPM

## 2021-02-18 VITALS
TEMPERATURE: 98 F | HEIGHT: 69 IN | OXYGEN SATURATION: 98 % | HEART RATE: 54 BPM | DIASTOLIC BLOOD PRESSURE: 74 MMHG | SYSTOLIC BLOOD PRESSURE: 120 MMHG | WEIGHT: 160.06 LBS | RESPIRATION RATE: 16 BRPM

## 2021-02-18 DIAGNOSIS — G56.03 CARPAL TUNNEL SYNDROME, BILATERAL UPPER LIMBS: ICD-10-CM

## 2021-02-18 DIAGNOSIS — Z98.890 OTHER SPECIFIED POSTPROCEDURAL STATES: Chronic | ICD-10-CM

## 2021-02-18 DIAGNOSIS — Z90.89 ACQUIRED ABSENCE OF OTHER ORGANS: Chronic | ICD-10-CM

## 2021-02-18 PROCEDURE — 64721 CARPAL TUNNEL SURGERY: CPT | Mod: 79,LT

## 2021-02-18 RX ORDER — LAMOTRIGINE 25 MG/1
1 TABLET, ORALLY DISINTEGRATING ORAL
Qty: 0 | Refills: 0 | DISCHARGE

## 2021-02-18 RX ORDER — ONDANSETRON 8 MG/1
1 TABLET, FILM COATED ORAL
Qty: 21 | Refills: 0
Start: 2021-02-18 | End: 2021-02-24

## 2021-02-18 RX ORDER — OXYCODONE HYDROCHLORIDE 5 MG/1
1 TABLET ORAL
Qty: 10 | Refills: 0
Start: 2021-02-18 | End: 2021-02-20

## 2021-02-18 RX ORDER — GABAPENTIN 400 MG/1
0 CAPSULE ORAL
Qty: 0 | Refills: 0 | DISCHARGE

## 2021-02-18 RX ORDER — SODIUM CHLORIDE 9 MG/ML
1000 INJECTION, SOLUTION INTRAVENOUS
Refills: 0 | Status: DISCONTINUED | OUTPATIENT
Start: 2021-02-18 | End: 2021-02-18

## 2021-02-18 NOTE — ASU DISCHARGE PLAN (ADULT/PEDIATRIC) - ASU DC SPECIAL INSTRUCTIONSFT
1. Keep bandage on for 5 days. Then you can remove and get it wet. Otherwise cover hand with plastic bag for showering.    2. If you have a splint on, keep it on until you see Dr. Mayo in the office. Do not get the splint wet at all.    3. Elevate hand as much as possible and wiggle fingers as much as you can, as often as you think of it (wiggle 10 times every commercial  if you are watching TV, or reading a book after every 5 pages read). The exception is if you have a splint you will not be able to wiggle the affected digit. Try to wiggle the free ones though.    4. Walk plenty, no sitting around.    5. See Dr. Mayo in the office in about 7-10 days. Call to schedule. You will have your wound checked then, any sutures will be removed, and your splint (if you have one on) will be changed.    6. Pain medications were sent electronically to your pharmacy. Please pick them up on your way home.

## 2021-02-18 NOTE — ASU DISCHARGE PLAN (ADULT/PEDIATRIC) - CARE PROVIDER_API CALL
Blanquita Mayo)  [facility]  [address] [place] [address]  Phone: [phone]  Fax: [phone]  Follow Up Time:

## 2021-02-22 DIAGNOSIS — G47.33 OBSTRUCTIVE SLEEP APNEA (ADULT) (PEDIATRIC): ICD-10-CM

## 2021-02-22 DIAGNOSIS — G56.02 CARPAL TUNNEL SYNDROME, LEFT UPPER LIMB: ICD-10-CM

## 2021-02-26 ENCOUNTER — APPOINTMENT (OUTPATIENT)
Dept: ORTHOPEDIC SURGERY | Facility: CLINIC | Age: 66
End: 2021-02-26
Payer: MEDICAID

## 2021-02-26 VITALS
HEART RATE: 161 BPM | SYSTOLIC BLOOD PRESSURE: 130 MMHG | DIASTOLIC BLOOD PRESSURE: 81 MMHG | HEIGHT: 69 IN | BODY MASS INDEX: 23.99 KG/M2 | WEIGHT: 162 LBS

## 2021-02-26 PROCEDURE — 99024 POSTOP FOLLOW-UP VISIT: CPT

## 2021-02-26 NOTE — HISTORY OF PRESENT ILLNESS
[Clean/Dry/Intact] : clean, dry and intact [Neuro Intact] : an unremarkable neurological exam [Vascular Intact] : ~T peripheral vascular exam normal [Doing Well] : is doing well [Excellent Pain Control] : has excellent pain control [No Sign of Infection] : is showing no signs of infection [Healed] : not healed [de-identified] : Procedure: Left carpal tunnel release\par DOS: 2/18/21 [de-identified] : Mr. GA HARO  presents one week s/p left carpal tunnel release, doing well. He notes the paraesthesias along the median nerve distribution have resolved, but notes he has had intermittent paraesthesias along the ulnar nerve distribution. He reports mild pain along the incision, but is overall happy and healing well post operatively.  [de-identified] : LEFT Wrist Exam\par \par Skin is intact with a well-healed incision to the volar base of the hand. There is no evidence of infection. Wrist and digit range of motion is intact with flexion and extension, but slightly diminished secondary to stiffness. Sensation is grossly intact and capillary refill is brisk.\par  [de-identified] : no imaging [de-identified] : The patient is healing well s/p carpal tunnel release.  I recommend advancing activity from light activity to activity as tolerated over the next 2 weeks. The patient will continue to keep his incision clean and all times and work on range of motion to prevent stiffness. The patient will follow up as needed and is in agreement with the above plan.\par \par

## 2021-08-19 ENCOUNTER — APPOINTMENT (OUTPATIENT)
Dept: NEUROSURGERY | Facility: CLINIC | Age: 66
End: 2021-08-19
Payer: MEDICAID

## 2021-08-19 VITALS
HEART RATE: 48 BPM | DIASTOLIC BLOOD PRESSURE: 66 MMHG | SYSTOLIC BLOOD PRESSURE: 111 MMHG | HEIGHT: 69 IN | OXYGEN SATURATION: 97 % | TEMPERATURE: 98.2 F | WEIGHT: 162 LBS | BODY MASS INDEX: 23.99 KG/M2

## 2021-08-19 PROCEDURE — 99212 OFFICE O/P EST SF 10 MIN: CPT

## 2021-08-19 NOTE — CONSULT LETTER
[Dear  ___] : Dear  [unfilled], [Courtesy Letter:] : I had the pleasure of seeing your patient, [unfilled], in my office today. [Sincerely,] : Sincerely, [FreeTextEntry2] : Kirstin Dumont MD\par [address]\[place] [address]  [FreeTextEntry1] : Mr. Brice is a very pleasant 66-year-old male patient who was seen in our office today in regards to  fingertip numbness.\par \par The patient was previously seen in regards to hand pain and numbness and was subsequently diagnosed with carpal tunnel syndrome.  The patient underwent surgery with excellent results and  currently the patient only has numbness in the tips of the index finger, middle finger, and ring finger bilaterally.  The patient has no other symptoms.  The patient does not endorse new clumsiness in the upper or lower extremities.  Patient denies any new bowel/bladder symptoms.\par \par On examination, the patient is alert, oriented, and compliant with the exam.  The patient demonstrates 5/5 strength in the upper and lower extremities bilaterally.  The patient endorses sensory changes in the fingertips of the second, third, and fourth digits bilaterally.\par \par I have no new imaging to review today.\par \par Taken together, the patient has symptoms most likely secondary to residual numbness in the fingertips after severe median nerve neuropathy.  I have informed the patient that he has no symptoms consistent with a cervical myelopathy and that his previous imaging of the cervical spine was relatively benign.  I have explained to the patient other possibilities exist such as the development of a peripheral sensory neuropathy which can be investigated further should the need arise. The patient will be consulting with his hand surgeon shortly for the possibility of other potential treatments for his fairly mild symptoms. [FreeTextEntry3] : Tomasz Box MD, PhD, FRCPSC \par Attending Neurosurgeon \par [facility] \par [address] \[place] [address] \par Office: [phone] \par Fax: [phone]\par \par

## 2021-09-08 ENCOUNTER — APPOINTMENT (OUTPATIENT)
Dept: ORTHOPEDIC SURGERY | Facility: CLINIC | Age: 66
End: 2021-09-08

## 2021-09-11 ENCOUNTER — NON-APPOINTMENT (OUTPATIENT)
Age: 66
End: 2021-09-11

## 2021-09-15 ENCOUNTER — APPOINTMENT (OUTPATIENT)
Dept: CARDIOLOGY | Facility: CLINIC | Age: 66
End: 2021-09-15
Payer: MEDICAID

## 2021-09-15 ENCOUNTER — LABORATORY RESULT (OUTPATIENT)
Age: 66
End: 2021-09-15

## 2021-09-15 ENCOUNTER — APPOINTMENT (OUTPATIENT)
Dept: INTERNAL MEDICINE | Facility: CLINIC | Age: 66
End: 2021-09-15
Payer: MEDICAID

## 2021-09-15 ENCOUNTER — NON-APPOINTMENT (OUTPATIENT)
Age: 66
End: 2021-09-15

## 2021-09-15 VITALS
HEIGHT: 69 IN | SYSTOLIC BLOOD PRESSURE: 120 MMHG | OXYGEN SATURATION: 98 % | DIASTOLIC BLOOD PRESSURE: 72 MMHG | TEMPERATURE: 98.1 F | BODY MASS INDEX: 23.7 KG/M2 | HEART RATE: 48 BPM | WEIGHT: 160 LBS

## 2021-09-15 VITALS
WEIGHT: 156 LBS | HEART RATE: 54 BPM | DIASTOLIC BLOOD PRESSURE: 68 MMHG | SYSTOLIC BLOOD PRESSURE: 112 MMHG | HEIGHT: 69 IN | OXYGEN SATURATION: 98 % | BODY MASS INDEX: 23.11 KG/M2

## 2021-09-15 DIAGNOSIS — H61.20 IMPACTED CERUMEN, UNSPECIFIED EAR: ICD-10-CM

## 2021-09-15 DIAGNOSIS — G56.03 CARPAL TUNNEL SYNDROM,BILATERAL UPPER LIMBS: ICD-10-CM

## 2021-09-15 DIAGNOSIS — Z98.890 OTHER SPECIFIED POSTPROCEDURAL STATES: ICD-10-CM

## 2021-09-15 DIAGNOSIS — R00.1 BRADYCARDIA, UNSPECIFIED: ICD-10-CM

## 2021-09-15 DIAGNOSIS — M89.8X1 OTHER SPECIFIED DISORDERS OF BONE, SHOULDER: ICD-10-CM

## 2021-09-15 DIAGNOSIS — F32.5 MAJOR DEPRESSIVE DISORDER, SINGLE EPISODE, IN FULL REMISSION: ICD-10-CM

## 2021-09-15 PROCEDURE — 93000 ELECTROCARDIOGRAM COMPLETE: CPT | Mod: 59

## 2021-09-15 PROCEDURE — 93000 ELECTROCARDIOGRAM COMPLETE: CPT

## 2021-09-15 PROCEDURE — 99396 PREV VISIT EST AGE 40-64: CPT | Mod: 25

## 2021-09-15 PROCEDURE — 99204 OFFICE O/P NEW MOD 45 MIN: CPT

## 2021-09-15 RX ORDER — GABAPENTIN 100 MG/1
100 CAPSULE ORAL AT BEDTIME
Qty: 30 | Refills: 1 | Status: DISCONTINUED | COMMUNITY
Start: 2020-10-05 | End: 2021-09-15

## 2021-09-15 RX ORDER — LAMOTRIGINE 100 MG/1
100 TABLET ORAL
Qty: 30 | Refills: 0 | Status: DISCONTINUED | COMMUNITY
Start: 2020-05-06 | End: 2021-09-15

## 2021-09-15 NOTE — ASSESSMENT
[FreeTextEntry1] : 1.marketed sinus bradycardia: EKG showing heart rate of 43. Patient states that he does have on-and-off fatigue but no symptoms of dizziness or lightheadedness. Referred to cardiology with appointment today at 2 PM. Will need echocardiogram and Holter monitoring. He'll need clearance from cardiology to participate and hot yoga and marathon. Discussed blood work to obtain.\par \par 2.left knee pain: Advise obtaining left knee x-ray to further investigate. Continue with ibuprofen as needed.\par \par 3.left great toe pain: Likely ligament or tendon injury. Advise followup with orthopedic foot and ankle surgeon.\par \par 4.bilateral cerumen impaction: Trial of Debrox otic drops and follow up with ENT for saline irrigation if no improvement.\par \par 5.shoulder blade pain: Discussed preventative techniques and to use heating pad as needed.\par \par 6.bilateral carpal tunnel syndrome: Status post bilateral release. Continue followup with orthopedic hand surgery for continued symptoms.\par \par 7.hyperlipidemia: Recheck fasting lipid. Patient advised on low cholesterol diet-decrease in white carbs and exercise 150 minutes per week.\par

## 2021-09-15 NOTE — HISTORY OF PRESENT ILLNESS
[FreeTextEntry1] : CPE [de-identified] : GA HARO is a 66 year M who comes in for an annual physical exam.\par He did have b/l carpal tunnel release earlier this year. He still has mild numbness of his fingertips. \par Patient has complaints of left great toe pain after stepping on his toe multiple months ago. He states when bending his toe he feels pain especially after walking on it.\par He also notes left knee pain on and off for the past few months. He feels pain worsen after running on it while exercising.\par Patient also notes pain in the right ear especially when he was at a wedding recently and had high-pitched noise in his right ear. No discharge from the ear.\par He's also noted on and off right shoulder blade pain which often resolves with movement.\par Patient does practice hot yoga and does sweat a lot and notes having extreme fatigue after the sessions.\par At times he has to take a nap in between sessions. He is also planning to run a 6 mile race this weekend. He denies any dizziness or lightheadedness or any syncope or presyncope. He notes a family history his father of bradycardia with pacemaker placement due to syncope.\par Patient denies any cp, sob,abdominal pain, nausea, vomiting, palpitations, fever, chills, constipation, diarrhea.\par

## 2021-09-15 NOTE — DATA REVIEWED
[FreeTextEntry1] : EKG: marked sinus bradycardia at 43 bpm, no ST-T changes, NO previous ECG to compare.\par

## 2021-09-15 NOTE — PHYSICAL EXAM
[Normal S1, S2] : normal S1, S2 [Soft] : abdomen soft [Normal Gait] : normal gait [No Edema] : no edema [Normal] : moves all extremities, no focal deficits, normal speech [Alert and Oriented] : alert and oriented

## 2021-09-15 NOTE — HISTORY OF PRESENT ILLNESS
[FreeTextEntry1] : This is a 67 Y/o gentleman PMH: HLD, Depression followed with Psychiatry, B/L carpal tunnel release here today for evaluation of Sinus Bradycardia. States he joggs approx 3 miles 2-3x/week W/O limitations or symptoms;  He denies Dizziness/syncope/CP/SOB Although, states he does participate in hot yoga/boot camp daily having extreme fatigue after the session these symptoms can last 1-2 hours post session.  \par At times he has to take a nap in between sessions.  \par \par \par PMH: As above\par PSH: Left inguinal hernia\par FH: Brother enlarged Aorta which patient states he saw a Cardiologist in [place] Dr Chaka Adames  for this approx 6 years ago states he had stress test and was advised he had  " bundle branch block "   \par Father:  bradycardia with pacemaker placement due to syncope.\par Mother:  liver cancer \par Brother: Testicular cancer \par \par Social: Non smoker, rare ETOH, Exercised daily, employed teacher\par Meds: reviewed\par ALL: NKDA

## 2021-09-15 NOTE — HEALTH RISK ASSESSMENT
[Yes] : Yes [2 - 4 times a month (2 pts)] : 2-4 times a month (2 points) [1 or 2 (0 pts)] : 1 or 2 (0 points) [Never (0 pts)] : Never (0 points) [1] : 1) Little interest or pleasure doing things for several days (1) [0] : 2) Feeling down, depressed, or hopeless: Not at all (0) [Patient reported colonoscopy was normal] : Patient reported colonoscopy was normal [PHQ-2 Negative - No further assessment needed] : PHQ-2 Negative - No further assessment needed [I have developed a follow-up plan documented below in the note.] : I have developed a follow-up plan documented below in the note. [] : No [[id number]] : 1 [ColonoscopyDate] : 01/2018 [ColonoscopyComments] : normal, f/u 5 yrs.

## 2021-09-17 ENCOUNTER — APPOINTMENT (OUTPATIENT)
Dept: CARDIOLOGY | Facility: CLINIC | Age: 66
End: 2021-09-17
Payer: MEDICAID

## 2021-09-17 PROCEDURE — 93015 CV STRESS TEST SUPVJ I&R: CPT

## 2021-09-20 ENCOUNTER — APPOINTMENT (OUTPATIENT)
Dept: CARDIOLOGY | Facility: CLINIC | Age: 66
End: 2021-09-20
Payer: MEDICAID

## 2021-09-20 LAB
CK SERPL-CCNC: 80 U/L
T3 SERPL-MCNC: 85 NG/DL
T4 SERPL-MCNC: 6.6 UG/DL
TSH SERPL-ACNC: 2.64 UIU/ML

## 2021-09-20 PROCEDURE — 93306 TTE W/DOPPLER COMPLETE: CPT

## 2021-09-22 ENCOUNTER — NON-APPOINTMENT (OUTPATIENT)
Age: 66
End: 2021-09-22

## 2021-09-27 ENCOUNTER — APPOINTMENT (OUTPATIENT)
Age: 66
End: 2021-09-27
Payer: MEDICAID

## 2021-09-27 ENCOUNTER — OUTPATIENT (OUTPATIENT)
Dept: OUTPATIENT SERVICES | Facility: HOSPITAL | Age: 66
LOS: 1 days | End: 2021-09-27
Payer: MEDICAID

## 2021-09-27 DIAGNOSIS — Z98.890 OTHER SPECIFIED POSTPROCEDURAL STATES: Chronic | ICD-10-CM

## 2021-09-27 DIAGNOSIS — Z90.89 ACQUIRED ABSENCE OF OTHER ORGANS: Chronic | ICD-10-CM

## 2021-09-27 DIAGNOSIS — G47.33 OBSTRUCTIVE SLEEP APNEA (ADULT) (PEDIATRIC): ICD-10-CM

## 2021-09-27 PROCEDURE — 95806 SLEEP STUDY UNATT&RESP EFFT: CPT

## 2021-09-27 PROCEDURE — 95806 SLEEP STUDY UNATT&RESP EFFT: CPT | Mod: 26

## 2021-09-29 LAB
25(OH)D3 SERPL-MCNC: 37.4 NG/ML
ALBUMIN SERPL ELPH-MCNC: 4.9 G/DL
ALP BLD-CCNC: 117 U/L
ALT SERPL-CCNC: 19 U/L
ANION GAP SERPL CALC-SCNC: 15 MMOL/L
AST SERPL-CCNC: 19 U/L
B BURGDOR IGG+IGM SER QL IB: NORMAL
BASOPHILS # BLD AUTO: 0.07 K/UL
BASOPHILS NFR BLD AUTO: 1 %
BILIRUB SERPL-MCNC: 0.5 MG/DL
BUN SERPL-MCNC: 19 MG/DL
CALCIUM SERPL-MCNC: 10 MG/DL
CHLORIDE SERPL-SCNC: 104 MMOL/L
CHOLEST SERPL-MCNC: 245 MG/DL
CO2 SERPL-SCNC: 23 MMOL/L
CREAT SERPL-MCNC: 0.96 MG/DL
EOSINOPHIL # BLD AUTO: 0.47 K/UL
EOSINOPHIL NFR BLD AUTO: 7 %
ESTIMATED AVERAGE GLUCOSE: 108 MG/DL
GLUCOSE SERPL-MCNC: 95 MG/DL
HBA1C MFR BLD HPLC: 5.4 %
HCT VFR BLD CALC: 47.1 %
HDLC SERPL-MCNC: 49 MG/DL
HGB BLD-MCNC: 15.3 G/DL
IMM GRANULOCYTES NFR BLD AUTO: 0.4 %
LDLC SERPL CALC-MCNC: 174 MG/DL
LYMPHOCYTES # BLD AUTO: 1.31 K/UL
LYMPHOCYTES NFR BLD AUTO: 19.4 %
MAGNESIUM SERPL-MCNC: 2.3 MG/DL
MAN DIFF?: NORMAL
MCHC RBC-ENTMCNC: 31.5 PG
MCHC RBC-ENTMCNC: 32.5 GM/DL
MCV RBC AUTO: 97.1 FL
MONOCYTES # BLD AUTO: 0.91 K/UL
MONOCYTES NFR BLD AUTO: 13.5 %
NEUTROPHILS # BLD AUTO: 3.96 K/UL
NEUTROPHILS NFR BLD AUTO: 58.7 %
NONHDLC SERPL-MCNC: 196 MG/DL
PLATELET # BLD AUTO: 228 K/UL
POTASSIUM SERPL-SCNC: 5.1 MMOL/L
PROT SERPL-MCNC: 7.2 G/DL
PSA SERPL-MCNC: 0.54 NG/ML
RBC # BLD: 4.85 M/UL
RBC # FLD: 13.5 %
SODIUM SERPL-SCNC: 141 MMOL/L
TRIGL SERPL-MCNC: 107 MG/DL
TSH SERPL-ACNC: 2.6 UIU/ML
VIT B12 SERPL-MCNC: 1388 PG/ML
WBC # FLD AUTO: 6.75 K/UL

## 2021-09-30 ENCOUNTER — NON-APPOINTMENT (OUTPATIENT)
Age: 66
End: 2021-09-30

## 2021-10-01 ENCOUNTER — APPOINTMENT (OUTPATIENT)
Dept: ORTHOPEDIC SURGERY | Facility: CLINIC | Age: 66
End: 2021-10-01
Payer: MEDICAID

## 2021-10-01 PROCEDURE — 73630 X-RAY EXAM OF FOOT: CPT | Mod: LT

## 2021-10-01 PROCEDURE — 99214 OFFICE O/P EST MOD 30 MIN: CPT

## 2021-10-01 NOTE — PHYSICAL EXAM
[de-identified] : General: Alert and oriented x3. In no acute distress. Pleasant in nature with a normal affect. No apparent respiratory distress.\par \par Left Foot and Ankle Exam\par Skin: Clean, dry, intact\par Inspection: No obvious malalignment, no masses, no swelling, no effusion\par Pulses: 2+ DP/PT pulses\par ROM: FOOT Full  ROM of digits, ANKLE 10 degrees of dorsiflexion, 40 degrees of plantarflexion, 10 degrees of subtalar motion.\par Painful ROM: None\par Tenderness: No tenderness over the medial malleolus, No tenderness over the lateral malleolus, no CFL/ATFL/PTFL pain, no deltoid ligament pain. No heel pain. No Achilles tenderness. Tenderness to the great toe. No 5th metatarsal pain. No pain to the LisFranc joint. No ttp over the posterior tibial tendon.\par Stability: Negative anterior/posterior drawer.\par Strength: 5/5 ADD/ABD/TA/GS/EHL/FHL/EDL\par Neuro: Sensation in tact to light touch throughout\par Additional tests: Negative Mortons test, negative tarsal tunnel tinels, negative single heel rise.	 [de-identified] : 3V of the left foot were ordered, obtained, and reviewed by me today, 10/01/2021, revealed: No acute fractures. \par

## 2021-10-01 NOTE — REASON FOR VISIT
[Initial Visit] : an initial visit for [FreeTextEntry2] : left great toe and pain Complex Repair And Graft Additional Text (Will Appearing After The Standard Complex Repair Text): The complex repair was not sufficient to completely close the primary defect. The remaining additional defect was repaired with the graft mentioned below.

## 2021-10-01 NOTE — DISCUSSION/SUMMARY
[de-identified] : Today I had a lengthy discussion with the patient regarding their left foot pain. I have addressed all the patient's concerns surrounding the pathology of their condition. XR imaging was completed in office today and results were reviewed with the patient, revealing no acute fractures. I recommend that the patient utilize Voltaren gel topically. If the Voltaren gel could not be obtained, Icy Hot, Biofreeze, or Bengay can be utilized instead.		\par \par At this time I would like to obtain advanced imaging of the patient's left foot. An MRI was ordered so I can find out more about the etiology of the patient's condition. The patient should follow up with the office after obtaining the MRI. The patient understood and verbally agreed to the treatment plan. All of their questions were answered and they were satisfied with the visit. The patient should call the office if they have any questions or experience worsening symptoms.

## 2021-10-01 NOTE — HISTORY OF PRESENT ILLNESS
[FreeTextEntry1] : 10/01/2021: GA HARO is a 66 year old male presenting for an initial evaluation of chronic left foot pain. The patient’s pain is noted to be a 6/10.  The patient states that he injured his great toe 8 months prior in a stubbing injury. The patient confirms difficulty with dorsiflexion, while walking, and with running. He denies any popping sounds. The patient states that he utilizes NSAIDs with moderate relief noted. GA denies any numbness or tingling sensations. No other complaints. He presents wearing flip-flops. 	\par

## 2021-10-01 NOTE — ADDENDUM
[FreeTextEntry1] : I, Stephanie Solitario, acted solely as a scribe for Dr. Jamie Goodwin on this date 10/01/2021.\par \par All medical record entries made by the Scribe were at my, Dr. Jamie Goodwin, direction and personally dictated by me on 10/01/2021 . I have reviewed the chart and agree that the record accurately reflects my personal performance of the history, physical exam, assessment and plan. I have also personally directed, reviewed, and agreed with the chart.	\par

## 2021-10-22 ENCOUNTER — APPOINTMENT (OUTPATIENT)
Dept: CARDIOLOGY | Facility: CLINIC | Age: 66
End: 2021-10-22
Payer: MEDICAID

## 2021-10-22 ENCOUNTER — NON-APPOINTMENT (OUTPATIENT)
Age: 66
End: 2021-10-22

## 2021-10-22 VITALS
SYSTOLIC BLOOD PRESSURE: 132 MMHG | OXYGEN SATURATION: 100 % | WEIGHT: 161 LBS | BODY MASS INDEX: 23.85 KG/M2 | HEART RATE: 53 BPM | HEIGHT: 69 IN | DIASTOLIC BLOOD PRESSURE: 85 MMHG

## 2021-10-22 PROCEDURE — 99214 OFFICE O/P EST MOD 30 MIN: CPT

## 2021-10-22 PROCEDURE — 93000 ELECTROCARDIOGRAM COMPLETE: CPT

## 2021-10-22 RX ORDER — LAMOTRIGINE 25 MG/1
25 TABLET ORAL DAILY
Refills: 0 | Status: DISCONTINUED | COMMUNITY
Start: 2020-04-06 | End: 2021-10-22

## 2021-10-22 NOTE — HISTORY OF PRESENT ILLNESS
[FreeTextEntry1] :  65 Y/o gentleman PMH: HLD, Depression followed with Psychiatry, B/L carpal tunnel release \par referred for evaluation of Sinus Bradycardia Sep '21\par here for followup.\par \par Reviewed echo, sleep study, thyroid panel, Lyme profile, EST.\par \par Sleep study w/ mild JAVIER by home study\par Sleep center reccomends overnight testing.\par Pt was reluctant to follow up, I encouraged him to schedule testing.\par \par EST w/ no ischemia excellent functional capacity 14 min (15 METs).\par Echo w/ normal EF.  All other testing unremarkable\par All other testing unremarkable.\par \par ho aortic disease in brother but echo today \par w/ only borderline aortic root dilation (3.7 cm) & normal ascending aorta diameter.

## 2021-10-22 NOTE — ASSESSMENT
[FreeTextEntry1] : *Bradycardia\par -pt has excellent funtional capacity -former marathon runner\par remains active most days of week w/ HIIIT & other exercises\par -Echo, EST & other testing unremarkable.\par -Holter monitor pending - pt not sure if he completed this\par will obtaine results & contact him.\par \par -Suspicion for significant bradyarrhythmias is very low\par -Will review holter & contact pt w results,\par No further followup scheduled at this time,\par if holter abnormal will consider further workup.\par \par ===================\par \par Addendum.\par -After visit, I see that holter was not completed by pt.\par -Called him but could not reach him, no cell phone.\par -Reccomend Holter monitor - he could complete this\par then call me over phone to review results.

## 2021-11-02 ENCOUNTER — APPOINTMENT (OUTPATIENT)
Dept: INTERNAL MEDICINE | Facility: CLINIC | Age: 66
End: 2021-11-02
Payer: MEDICAID

## 2021-11-02 ENCOUNTER — APPOINTMENT (OUTPATIENT)
Dept: INTERNAL MEDICINE | Facility: CLINIC | Age: 66
End: 2021-11-02

## 2021-11-02 ENCOUNTER — MED ADMIN CHARGE (OUTPATIENT)
Age: 66
End: 2021-11-02

## 2021-11-02 PROCEDURE — 90686 IIV4 VACC NO PRSV 0.5 ML IM: CPT

## 2021-11-02 PROCEDURE — G0008: CPT

## 2021-11-05 ENCOUNTER — TRANSCRIPTION ENCOUNTER (OUTPATIENT)
Age: 66
End: 2021-11-05

## 2021-11-16 ENCOUNTER — APPOINTMENT (OUTPATIENT)
Dept: MRI IMAGING | Facility: CLINIC | Age: 66
End: 2021-11-16
Payer: MEDICAID

## 2021-11-16 ENCOUNTER — OUTPATIENT (OUTPATIENT)
Dept: OUTPATIENT SERVICES | Facility: HOSPITAL | Age: 66
LOS: 1 days | End: 2021-11-16
Payer: MEDICAID

## 2021-11-16 DIAGNOSIS — Z98.890 OTHER SPECIFIED POSTPROCEDURAL STATES: Chronic | ICD-10-CM

## 2021-11-16 DIAGNOSIS — M79.675 PAIN IN LEFT TOE(S): ICD-10-CM

## 2021-11-16 DIAGNOSIS — Z00.8 ENCOUNTER FOR OTHER GENERAL EXAMINATION: ICD-10-CM

## 2021-11-16 DIAGNOSIS — Z90.89 ACQUIRED ABSENCE OF OTHER ORGANS: Chronic | ICD-10-CM

## 2021-11-16 DIAGNOSIS — S99.922A UNSPECIFIED INJURY OF LEFT FOOT, INITIAL ENCOUNTER: ICD-10-CM

## 2021-11-16 PROCEDURE — 73718 MRI LOWER EXTREMITY W/O DYE: CPT

## 2021-11-16 PROCEDURE — 73718 MRI LOWER EXTREMITY W/O DYE: CPT | Mod: 26,LT

## 2021-12-10 ENCOUNTER — APPOINTMENT (OUTPATIENT)
Dept: PULMONOLOGY | Facility: CLINIC | Age: 66
End: 2021-12-10
Payer: MEDICAID

## 2021-12-10 VITALS — DIASTOLIC BLOOD PRESSURE: 80 MMHG | SYSTOLIC BLOOD PRESSURE: 138 MMHG

## 2021-12-10 VITALS
HEART RATE: 51 BPM | WEIGHT: 159 LBS | BODY MASS INDEX: 23.55 KG/M2 | RESPIRATION RATE: 15 BRPM | HEIGHT: 69 IN | OXYGEN SATURATION: 99 %

## 2021-12-10 DIAGNOSIS — G47.19 OTHER HYPERSOMNIA: ICD-10-CM

## 2021-12-10 PROCEDURE — 99203 OFFICE O/P NEW LOW 30 MIN: CPT
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
## 2021-12-10 NOTE — PHYSICAL EXAM
[No Acute Distress] : no acute distress [Elongated Uvula] : elongated uvula [Enlarged Base of the Tongue] : enlarged base of the tongue [Retrognathia] : retrognathia [Normal Appearance] : normal appearance [No Neck Mass] : no neck mass [Normal Rate/Rhythm] : normal rate/rhythm [Normal S1, S2] : normal s1, s2 [No Resp Distress] : no resp distress [Clear to Auscultation Bilaterally] : clear to auscultation bilaterally [No Clubbing] : no clubbing [No Cyanosis] : no cyanosis [No Edema] : no edema [TextBox_11] : Veiled palate

## 2021-12-10 NOTE — CONSULT LETTER
[Dear  ___] : Dear  [unfilled], [Consult Letter:] : I had the pleasure of evaluating your patient, [unfilled]. [Please see my note below.] : Please see my note below. [Consult Closing:] : Thank you very much for allowing me to participate in the care of this patient.  If you have any questions, please do not hesitate to contact me. [Sincerely,] : Sincerely, [DrLaya  ___] : Dr. PIEDRA

## 2021-12-20 ENCOUNTER — APPOINTMENT (OUTPATIENT)
Dept: ORTHOPEDIC SURGERY | Facility: CLINIC | Age: 66
End: 2021-12-20
Payer: MEDICAID

## 2021-12-20 DIAGNOSIS — S99.922A UNSPECIFIED INJURY OF LEFT FOOT, INITIAL ENCOUNTER: ICD-10-CM

## 2021-12-20 DIAGNOSIS — M79.675 PAIN IN LEFT TOE(S): ICD-10-CM

## 2021-12-20 PROCEDURE — 99214 OFFICE O/P EST MOD 30 MIN: CPT

## 2021-12-20 NOTE — DISCUSSION/SUMMARY
[de-identified] : Today I had a lengthy discussion with the patient regarding their left foot, great toe pain. I have addressed all the patient's concerns surrounding the pathology of their condition. MRI results were reviewed with the patient today in the clinic. In order to provide the patient with a better understanding of their ailment, I educated them about the anatomy, physiology and lifespan of their condition using a foot model. We discussed non-operative treatment options in great detail. At this time, I recommend that the patient utilize a Schultz extension plate. The patient was provided with the Schultz extension plate in the office today. He may utilize NSAIDs PRN for pain relief. The patient understood and verbally agreed to the treatment plan. All of their questions were answered and they were satisfied with the visit. The patient should call the office if they have any questions or experience worsening symptoms. I would like to see the patient back in the office in 6-8 weeks PRN to reassess their condition. The patient will also follow-up with the office for an initial evaluation of their knee pain.  			\par
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
## 2021-12-20 NOTE — PHYSICAL EXAM
[de-identified] : General: Alert and oriented x3. In no acute distress. Pleasant in nature with a normal affect. No apparent respiratory distress.\par \par Left Foot and Ankle Exam\par Skin: Clean, dry, intact\par Inspection: No obvious malalignment, no masses, no swelling, no effusion\par Pulses: 2+ DP/PT pulses\par ROM: FOOT Full  ROM of digits, ANKLE 10 degrees of dorsiflexion, 40 degrees of plantarflexion, 10 degrees of subtalar motion.\par Painful ROM: None\par Tenderness: Tenderness to the great toe. No tenderness over the medial malleolus, No tenderness over the lateral malleolus, no CFL/ATFL/PTFL pain, no deltoid ligament pain. No heel pain. No Achilles tenderness. No 5th metatarsal pain. No pain to the LisFranc joint. No ttp over the posterior tibial tendon.\par Stability: Negative anterior/posterior drawer.\par Strength: 5/5 ADD/ABD/TA/GS/EHL/FHL/EDL\par Neuro: Sensation in tact to light touch throughout\par Additional tests: Negative Mortons test, negative tarsal tunnel tinels, negative single heel rise.	 [de-identified] : \par   MR Foot No Cont, Left             Final\par \par No Documents Attached\par \par \par \par \par   EXAM:  MR FOOT LT\par \par \par PROCEDURE DATE:  11/16/2021\par \par \par \par INTERPRETATION:  MRI OF THE LEFT FOOT\par \par CLINICAL INFORMATION: Hyperflexion injury to left great toe.\par TECHNIQUE: Multiplanar, multisequence MRI was obtained of the left foot.\par COMPARISON: Left foot radiographs dated 10/1/2021 retrieved from [facility].\par \par FINDINGS:\par \par LIGAMENTS AND CAPSULAR STRUCTURES: Mild degeneration of the lateral second third plantar plates without discrete tear. A tiny focus of fluid underlying the fourth plantar plates just an occult perforation. There is a short segment of intermediate signal of the extensor hallucis longus tendon at the level of the proximal metatarsal, which may be artifactual due to magic angle. The Lisfranc ligament is intact.\par MUSCLES AND TENDONS: The tendons are preserved. There is no muscular edema, atrophy, or fatty replacement.\par CARTILAGE AND SUBCHONDRAL BONE: There is moderate arthrosis of the first MTP joint with multiple thin full-thickness chondral fissures and subchondral cystic change/edema on both sides the joint with mild osseous productive change and joint space narrowing. There is mild degenerative change between the sesamoids and the first metatarsal head.\par OSSEOUS ALIGNMENT: Normal.\par BONE MARROW: No acute fracture or stress reaction.\par WEB SPACES: No perineural fibrosis or intermetatarsal bursitis.\par PERIPHERAL SOFT TISSUES: No abnormality seen.\par \par IMPRESSION:\par 1.  There is questionable short segment tendinosis of the extensor hallucis longus at the level of the proximal metatarsal, which is otherwise intact without high-grade tear.\par 2.  Moderate osteoarthrosis of the first metatarsophalangeal joint. Mild osteoarthrosis of the hallux sesamoidal-metatarsal articulations.\par \par --- End of Report ---\par \par \par ABBY ABRAMS M.D., RADIOLOGY FELLOW\par This document has been electronically signed.\par RONAN CORDOVA MD; Attending Radiologist\par This document has been electronically signed. Nov 23 2021  4:46PM\par

## 2021-12-20 NOTE — ADDENDUM
[FreeTextEntry1] : I, Stephanie Solitario, acted solely as a scribe for Dr. Jamie Goodwin on this date 12/20/2021.\par \par All medical record entries made by the Scribe were at my, Dr. Jamie Goodwin, direction and personally dictated by me on 12/20/2021 . I have reviewed the chart and agree that the record accurately reflects my personal performance of the history, physical exam, assessment and plan. I have also personally directed, reviewed, and agreed with the chart.	\par

## 2021-12-22 ENCOUNTER — APPOINTMENT (OUTPATIENT)
Dept: ORTHOPEDIC SURGERY | Facility: CLINIC | Age: 66
End: 2021-12-22
Payer: MEDICAID

## 2021-12-22 DIAGNOSIS — M20.22 HALLUX RIGIDUS, LEFT FOOT: ICD-10-CM

## 2021-12-22 PROCEDURE — 73562 X-RAY EXAM OF KNEE 3: CPT | Mod: LT

## 2021-12-22 PROCEDURE — 99214 OFFICE O/P EST MOD 30 MIN: CPT

## 2021-12-22 NOTE — DISCUSSION/SUMMARY
[de-identified] : Assessment: Left knee injury/pain. \par \par Plan:\par #1. NSAIDs/Tylenol as needed, as tolerated by patient.  Use as directed. \par #2. Ice and Heat Therapy. \par #3. Physical Therapy RX given. \par #4. OTC knee brace as needed. \par #5. Follow-up in 6-8 weeks.  If pain continues, consider MRI at that time.  Patient understood treatment course at this time.
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
## 2021-12-22 NOTE — PHYSICAL EXAM
[de-identified] : General: Alert and oriented x3. In no acute distress. Pleasant in nature with a normal affect. No apparent respiratory distress.\par \par Left Foot and Ankle Exam\par Skin: Clean, dry, intact\par Inspection: No obvious malalignment, no masses, no swelling, no effusion\par Pulses: 2+ DP/PT pulses\par ROM: FOOT Full  ROM of digits, ANKLE 10 degrees of dorsiflexion, 40 degrees of plantarflexion, 10 degrees of subtalar motion.\par Painful ROM: None\par Tenderness: Tenderness to the great toe. No tenderness over the medial malleolus, No tenderness over the lateral malleolus, no CFL/ATFL/PTFL pain, no deltoid ligament pain. No heel pain. No Achilles tenderness. No 5th metatarsal pain. No pain to the LisFranc joint. No ttp over the posterior tibial tendon.\par Stability: Negative anterior/posterior drawer.\par Strength: 5/5 ADD/ABD/TA/GS/EHL/FHL/EDL\par Neuro: Sensation in tact to light touch throughout\par Additional tests: Negative Mortons test, negative tarsal tunnel tinels, negative single heel rise.	\par \par \par Left knee Physical Examination:\par \par General: Alert and oriented x3.  In no acute distress.  Pleasant in nature with a normal affect.  No apparent respiratory distress. \par \par Erythema, Warmth, Rubor: Negative\par Swelling/Edema: Positive\par ROM: 0-120 degrees, pain with hyperflexion. \par Pauline's Test: Positive \par Medial Joint Line TTP: Positive\par Lateral Joint Line TTP: Negative\par Lachman Exam/Anterior Drawer/Pivot Shift Test: Negative \par MCL Pain: Negative\par LCL Pain: Negative\par Iliotibial Band Pain: Negative\par Patellofemoral Joint Pain: Negative\par Patellar Tendon TTP: Negative\par Pes Anserinus TTP: Negative\par Homans Sign: Negative\par Posterior Knee Pain: Negative\par Neuro: Intact with no sensory or motor deficits [de-identified] : X-rays taken of the left knee in office, reviewed on 12/22/2021 revealed: Osteoarthritis present.\par \par \par \par MR Foot No Cont, Left             Final\par \par No Documents Attached\par \par \par \par \par   EXAM:  MR FOOT LT\par \par \par PROCEDURE DATE:  11/16/2021\par \par \par \par INTERPRETATION:  MRI OF THE LEFT FOOT\par \par CLINICAL INFORMATION: Hyperflexion injury to left great toe.\par TECHNIQUE: Multiplanar, multisequence MRI was obtained of the left foot.\par COMPARISON: Left foot radiographs dated 10/1/2021 retrieved from [facility].\par \par FINDINGS:\par \par LIGAMENTS AND CAPSULAR STRUCTURES: Mild degeneration of the lateral second third plantar plates without discrete tear. A tiny focus of fluid underlying the fourth plantar plates just an occult perforation. There is a short segment of intermediate signal of the extensor hallucis longus tendon at the level of the proximal metatarsal, which may be artifactual due to magic angle. The Lisfranc ligament is intact.\par MUSCLES AND TENDONS: The tendons are preserved. There is no muscular edema, atrophy, or fatty replacement.\par CARTILAGE AND SUBCHONDRAL BONE: There is moderate arthrosis of the first MTP joint with multiple thin full-thickness chondral fissures and subchondral cystic change/edema on both sides the joint with mild osseous productive change and joint space narrowing. There is mild degenerative change between the sesamoids and the first metatarsal head.\par OSSEOUS ALIGNMENT: Normal.\par BONE MARROW: No acute fracture or stress reaction.\par WEB SPACES: No perineural fibrosis or intermetatarsal bursitis.\par PERIPHERAL SOFT TISSUES: No abnormality seen.\par \par IMPRESSION:\par 1.  There is questionable short segment tendinosis of the extensor hallucis longus at the level of the proximal metatarsal, which is otherwise intact without high-grade tear.\par 2.  Moderate osteoarthrosis of the first metatarsophalangeal joint. Mild osteoarthrosis of the hallux sesamoidal-metatarsal articulations.\par \par --- End of Report ---\par \par \par ABBY ABRAMS M.D., RADIOLOGY FELLOW\par This document has been electronically signed.\par RONAN CORDOVA MD; Attending Radiologist\par This document has been electronically signed. Nov 23 2021  4:46PM\par

## 2021-12-29 ENCOUNTER — TRANSCRIPTION ENCOUNTER (OUTPATIENT)
Age: 66
End: 2021-12-29

## 2021-12-29 ENCOUNTER — APPOINTMENT (OUTPATIENT)
Dept: GASTROENTEROLOGY | Facility: CLINIC | Age: 66
End: 2021-12-29
Payer: MEDICAID

## 2021-12-29 VITALS
HEIGHT: 69 IN | SYSTOLIC BLOOD PRESSURE: 133 MMHG | BODY MASS INDEX: 23.4 KG/M2 | WEIGHT: 158 LBS | DIASTOLIC BLOOD PRESSURE: 75 MMHG | HEART RATE: 87 BPM

## 2021-12-29 PROCEDURE — 99202 OFFICE O/P NEW SF 15 MIN: CPT

## 2021-12-29 NOTE — HISTORY OF PRESENT ILLNESS
[de-identified] : Mr. GA HARO is a 66 year old male presents for screening colonoscopy. Patient has no complaints of bowel issues, bleeding, abdominal pain, family history of colon cancer, GERD symptoms. Patient had last colonoscopy four to five years ago. \par \par

## 2022-03-03 ENCOUNTER — APPOINTMENT (OUTPATIENT)
Dept: PULMONOLOGY | Facility: CLINIC | Age: 67
End: 2022-03-03

## 2022-03-10 ENCOUNTER — APPOINTMENT (OUTPATIENT)
Dept: GASTROENTEROLOGY | Facility: AMBULATORY MEDICAL SERVICES | Age: 67
End: 2022-03-10
Payer: MEDICAID

## 2022-03-10 PROCEDURE — 45378 DIAGNOSTIC COLONOSCOPY: CPT | Mod: 33

## 2022-04-18 ENCOUNTER — TRANSCRIPTION ENCOUNTER (OUTPATIENT)
Age: 67
End: 2022-04-18

## 2022-05-09 ENCOUNTER — APPOINTMENT (OUTPATIENT)
Dept: ORTHOPEDIC SURGERY | Facility: CLINIC | Age: 67
End: 2022-05-09
Payer: MEDICAID

## 2022-05-09 PROCEDURE — 99213 OFFICE O/P EST LOW 20 MIN: CPT

## 2022-05-09 NOTE — ADDENDUM
[FreeTextEntry1] : I, Stephanie Solitario, acted solely as a scribe for Dr. Jamie Goodwin on this date 05/09/2022.\par \par All medical record entries made by the Scribe were at my, Dr. Jamie Goodwin, direction and personally dictated by me on 05/09/2022 . I have reviewed the chart and agree that the record accurately reflects my personal performance of the history, physical exam, assessment and plan. I have also personally directed, reviewed, and agreed with the chart.	\par
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
## 2022-05-09 NOTE — DISCUSSION/SUMMARY
[de-identified] : Assessment: Left knee pain/injury, internal derangement, osteoarthritis of the left knee. \par \par Plan:\par #1. MRI of the left knee without contrast ordered to evaluate for meniscal tearing/OA.  An MRI is warranted at this time as the patient has tried and failed conservative management. \par #2. Continue anti-inflammatories/Tylenol as needed for pain.\par #3. Over-the-counter knee brace as needed for support.\par #4. Continue ice and heat therapy. \par #5. Continue home exercise and stretching program.\par #6. Once the MRI is completed the patient will follow-up. All questions were answered. The patient understood the treatment course at this time.\par

## 2022-05-09 NOTE — PHYSICAL EXAM
[de-identified] : Left knee Physical Examination:\par \par General: Alert and oriented x3.  In no acute distress.  Pleasant in nature with a normal affect.  No apparent respiratory distress. \par \par Erythema, Warmth, Rubor: Negative\par Swelling/Edema: Positive\par ROM: 0-120 degrees, pain with hyperflexion. \par Pauline's Test: Positive \par Medial Joint Line TTP: Positive\par Lateral Joint Line TTP: Negative\par Lachman Exam/Anterior Drawer/Pivot Shift Test: Negative \par MCL Pain: Negative\par LCL Pain: Negative\par Iliotibial Band Pain: Negative\par Patellofemoral Joint Pain: Negative\par Patellar Tendon TTP: Negative\par Pes Anserinus TTP: Negative\par Homans Sign: Negative\par Posterior Knee Pain: Negative\par Neuro: Intact with no sensory or motor deficits [de-identified] : No new imaging.

## 2022-05-17 ENCOUNTER — OUTPATIENT (OUTPATIENT)
Dept: OUTPATIENT SERVICES | Facility: HOSPITAL | Age: 67
LOS: 1 days | End: 2022-05-17
Payer: MEDICAID

## 2022-05-17 ENCOUNTER — APPOINTMENT (OUTPATIENT)
Dept: MRI IMAGING | Facility: CLINIC | Age: 67
End: 2022-05-17
Payer: MEDICAID

## 2022-05-17 DIAGNOSIS — Z90.89 ACQUIRED ABSENCE OF OTHER ORGANS: Chronic | ICD-10-CM

## 2022-05-17 DIAGNOSIS — M25.562 PAIN IN LEFT KNEE: ICD-10-CM

## 2022-05-17 DIAGNOSIS — M23.92 UNSPECIFIED INTERNAL DERANGEMENT OF LEFT KNEE: ICD-10-CM

## 2022-05-17 DIAGNOSIS — Z98.890 OTHER SPECIFIED POSTPROCEDURAL STATES: Chronic | ICD-10-CM

## 2022-05-17 DIAGNOSIS — M17.12 UNILATERAL PRIMARY OSTEOARTHRITIS, LEFT KNEE: ICD-10-CM

## 2022-05-17 PROCEDURE — 73721 MRI JNT OF LWR EXTRE W/O DYE: CPT | Mod: 26,LT

## 2022-05-17 PROCEDURE — 73721 MRI JNT OF LWR EXTRE W/O DYE: CPT

## 2022-05-18 ENCOUNTER — APPOINTMENT (OUTPATIENT)
Dept: OTOLARYNGOLOGY | Facility: CLINIC | Age: 67
End: 2022-05-18
Payer: MEDICAID

## 2022-05-18 VITALS
WEIGHT: 159 LBS | SYSTOLIC BLOOD PRESSURE: 133 MMHG | BODY MASS INDEX: 23.55 KG/M2 | HEIGHT: 69 IN | DIASTOLIC BLOOD PRESSURE: 75 MMHG | TEMPERATURE: 96.5 F | HEART RATE: 87 BPM

## 2022-05-18 DIAGNOSIS — H90.3 SENSORINEURAL HEARING LOSS, BILATERAL: ICD-10-CM

## 2022-05-18 PROCEDURE — 92567 TYMPANOMETRY: CPT

## 2022-05-18 PROCEDURE — 99204 OFFICE O/P NEW MOD 45 MIN: CPT

## 2022-05-18 PROCEDURE — 92557 COMPREHENSIVE HEARING TEST: CPT

## 2022-05-18 NOTE — REVIEW OF SYSTEMS
[Seasonal Allergies] : seasonal allergies [Negative] : Heme/Lymph [FreeTextEntry1] : patient states no symptoms

## 2022-05-18 NOTE — HISTORY OF PRESENT ILLNESS
[de-identified] : c/o decreasing hearing - worse with TV and with crowds.  Problem bilat.  brother wears ha.  No prior ear problems.

## 2022-05-18 NOTE — ASSESSMENT
[FreeTextEntry1] : Patient with concerns about hearing. 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  Recommended conservative care and yearly audio.

## 2022-06-03 ENCOUNTER — APPOINTMENT (OUTPATIENT)
Dept: ORTHOPEDIC SURGERY | Facility: CLINIC | Age: 67
End: 2022-06-03
Payer: MEDICAID

## 2022-06-03 DIAGNOSIS — M23.92 UNSPECIFIED INTERNAL DERANGEMENT OF LEFT KNEE: ICD-10-CM

## 2022-06-03 PROCEDURE — 99213 OFFICE O/P EST LOW 20 MIN: CPT

## 2022-06-03 NOTE — DISCUSSION/SUMMARY
[de-identified] : Today I had a lengthy discussion with the patient regarding their left knee pain. I have addressed all the patient's concerns surrounding the pathology of their condition. MRI results were reviewed with the patient today in the office. Further, we discussed both operative and nonoperative treatment options in the office. The patient had a previous right knee arthroscopy in [place] and at this time, will return to his previous surgeon for a left knee arthroscopy. We did briefly discuss hyaluronic acid injections in the office today in approximately 2-3 months post surgery. The patient understood and verbally agreed to the treatment plan. All of their questions were answered and they were satisfied with the visit. The patient should call the office if they have any questions or experience worsening symptoms. I would like to see the patient back in the office in PRN to reassess their condition. 				\par

## 2022-06-03 NOTE — DISCUSSION/SUMMARY
[de-identified] : Today I had a lengthy discussion with the patient regarding their left knee pain. I have addressed all the patient's concerns surrounding the pathology of their condition. MRI results were reviewed with the patient today in the office. Further, we discussed both operative and nonoperative treatment options in the office. The patient had a previous right knee arthroscopy in [place] and at this time, will return to his previous surgeon for a left knee arthroscopy. We did briefly discuss hyaluronic acid injections in the office today in approximately 2-3 months post surgery. The patient understood and verbally agreed to the treatment plan. All of their questions were answered and they were satisfied with the visit. The patient should call the office if they have any questions or experience worsening symptoms. I would like to see the patient back in the office in [place] to reassess their condition. 				\par

## 2022-06-03 NOTE — PHYSICAL EXAM
[de-identified] : Left knee Physical Examination:\par \par General: Alert and oriented x3.  In no acute distress.  Pleasant in nature with a normal affect.  No apparent respiratory distress. \par \par Erythema, Warmth, Rubor: Negative\par Swelling/Edema: Positive\par ROM: 0-120 degrees, pain with hyperflexion. \par Pauline's Test: Positive \par Medial Joint Line TTP: Positive\par Lateral Joint Line TTP: Negative\par Lachman Exam/Anterior Drawer/Pivot Shift Test: Negative \par MCL Pain: Negative\par LCL Pain: Negative\par Iliotibial Band Pain: Negative\par Patellofemoral Joint Pain: Negative\par Patellar Tendon TTP: Negative\par Pes Anserinus TTP: Negative\par Homans Sign: Negative\par Posterior Knee Pain: Negative\par Neuro: Intact with no sensory or motor deficits [de-identified] : EXAM: [id number] - MR KNEE LT  - ORDERED BY:  TRAM KAYE\par \par PROCEDURE DATE:  05/17/2022\par \par \par \par INTERPRETATION:  EXAMINATION: MRI of the left knee\par \par HISTORY: Left knee pain\par \par TECHNIQUE: Multiplanar, multisequential MR imaging was performed.\par \par FINDINGS:\par \par Fluid: There is a small joint effusion.\par \par Ligaments: No tear of the cruciate or collateral ligaments.\par \par Medial Compartment: There is mild chondral wear along the central weightbearing portion of the medial femoral condyle and along the opposing medial tibial plateau without subchondral abnormality. There is a horizontal tear of the posterior horn of the medial meniscus which contacts the undersurface. There is a multiloculated parameniscal cyst at the periphery of the posterior horn.\par \par Lateral Compartment: There is no lateral meniscal tear. There is superficial chondral fissuring along the inner aspect of the central weightbearing portion of the lateral femoral condyle. Articular cartilage is otherwise preserved.\par \par Patellofemoral Compartment: There is high-grade chondral wear along the central trochlear sulcus with mild underlying subchondral marrow edema. There is moderate grade chondral wear along the medial and lateral patellar facets.\par \par Extensor Mechanism: No tear of the quadriceps or patellar tendons. 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 Associated horizontal tear of the posterior horn of the medial meniscus with parameniscal cyst.\par \par --- End of Report ---\par \par \par KESHAWN VARGAS MD; Attending Radiologist\par This document has been electronically signed. May 23 2022 11:15AM

## 2022-06-03 NOTE — PHYSICAL EXAM
[de-identified] : Left knee Physical Examination:\par \par General: Alert and oriented x3.  In no acute distress.  Pleasant in nature with a normal affect.  No apparent respiratory distress. \par \par Erythema, Warmth, Rubor: Negative\par Swelling/Edema: Positive\par ROM: 0-120 degrees, pain with hyperflexion. \par Pauline's Test: Positive \par Medial Joint Line TTP: Positive\par Lateral Joint Line TTP: Negative\par Lachman Exam/Anterior Drawer/Pivot Shift Test: Negative \par MCL Pain: Negative\par LCL Pain: Negative\par Iliotibial Band Pain: Negative\par Patellofemoral Joint Pain: Negative\par Patellar Tendon TTP: Negative\par Pes Anserinus TTP: Negative\par Homans Sign: Negative\par Posterior Knee Pain: Negative\par Neuro: Intact with no sensory or motor deficits [de-identified] : EXAM: [id number] - MR KNEE LT  - ORDERED BY:  TRAM KAYE\par \par PROCEDURE DATE:  05/17/2022\par \par \par \par INTERPRETATION:  EXAMINATION: MRI of the left knee\par \par HISTORY: Left knee pain\par \par TECHNIQUE: Multiplanar, multisequential MR imaging was performed.\par \par FINDINGS:\par \par Fluid: There is a small joint effusion.\par \par Ligaments: No tear of the cruciate or collateral ligaments.\par \par Medial Compartment: There is mild chondral wear along the central weightbearing portion of the medial femoral condyle and along the opposing medial tibial plateau without subchondral abnormality. There is a horizontal tear of the posterior horn of the medial meniscus which contacts the undersurface. There is a multiloculated parameniscal cyst at the periphery of the posterior horn.\par \par Lateral Compartment: There is no lateral meniscal tear. There is superficial chondral fissuring along the inner aspect of the central weightbearing portion of the lateral femoral condyle. Articular cartilage is otherwise preserved.\par \par Patellofemoral Compartment: There is high-grade chondral wear along the central trochlear sulcus with mild underlying subchondral marrow edema. There is moderate grade chondral wear along the medial and lateral patellar facets.\par \par Extensor Mechanism: No tear of the quadriceps or patellar tendons. 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 Associated horizontal tear of the posterior horn of the medial meniscus with parameniscal cyst.\par \par --- End of Report ---\par \par \par KESHAWN VARGAS MD; Attending Radiologist\par This document has been electronically signed. May 23 2022 11:15AM
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
## 2022-06-03 NOTE — ADDENDUM
[FreeTextEntry1] : I, Stephanie Solitario, acted solely as a scribe for Luis Ellsworth PA-C on this date 06/03/2022.\par \par All medical record entries made by the Scribe were at my, Luis Ellsworth PA-C, direction and personally dictated by me on 06/03/2022  . I have reviewed the chart and agree that the record accurately reflects my personal performance of the history, physical exam, assessment and plan. I have also personally directed, reviewed, and agreed with the chart.	\par

## 2022-08-25 ENCOUNTER — RESULT CHARGE (OUTPATIENT)
Age: 67
End: 2022-08-25

## 2022-08-26 ENCOUNTER — APPOINTMENT (OUTPATIENT)
Dept: INTERNAL MEDICINE | Facility: CLINIC | Age: 67
End: 2022-08-26

## 2022-08-26 ENCOUNTER — NON-APPOINTMENT (OUTPATIENT)
Age: 67
End: 2022-08-26

## 2022-08-26 VITALS
DIASTOLIC BLOOD PRESSURE: 62 MMHG | SYSTOLIC BLOOD PRESSURE: 120 MMHG | HEIGHT: 69 IN | OXYGEN SATURATION: 99 % | TEMPERATURE: 97 F | HEART RATE: 49 BPM | BODY MASS INDEX: 23.7 KG/M2 | WEIGHT: 160 LBS

## 2022-08-26 DIAGNOSIS — E83.19 OTHER DISORDERS OF IRON METABOLISM: ICD-10-CM

## 2022-08-26 DIAGNOSIS — E55.9 VITAMIN D DEFICIENCY, UNSPECIFIED: ICD-10-CM

## 2022-08-26 DIAGNOSIS — R52 PAIN, UNSPECIFIED: ICD-10-CM

## 2022-08-26 DIAGNOSIS — Z12.11 ENCOUNTER FOR SCREENING FOR MALIGNANT NEOPLASM OF COLON: ICD-10-CM

## 2022-08-26 DIAGNOSIS — R21 RASH AND OTHER NONSPECIFIC SKIN ERUPTION: ICD-10-CM

## 2022-08-26 PROCEDURE — 99215 OFFICE O/P EST HI 40 MIN: CPT | Mod: 25

## 2022-08-26 PROCEDURE — 36415 COLL VENOUS BLD VENIPUNCTURE: CPT

## 2022-08-26 PROCEDURE — 93000 ELECTROCARDIOGRAM COMPLETE: CPT | Mod: 59

## 2022-08-26 RX ORDER — MELOXICAM 7.5 MG/1
7.5 TABLET ORAL DAILY
Qty: 30 | Refills: 2 | Status: COMPLETED | COMMUNITY
Start: 2021-12-22 | End: 2022-08-26

## 2022-08-26 RX ORDER — TURMERIC ROOT EXTRACT 500 MG
TABLET ORAL
Refills: 0 | Status: COMPLETED | COMMUNITY
End: 2022-08-26

## 2022-08-26 RX ORDER — SODIUM SULFATE, POTASSIUM SULFATE, MAGNESIUM SULFATE 17.5; 3.13; 1.6 G/ML; G/ML; G/ML
17.5-3.13-1.6 SOLUTION, CONCENTRATE ORAL
Qty: 2 | Refills: 0 | Status: COMPLETED | COMMUNITY
Start: 2021-12-29 | End: 2022-08-26

## 2022-08-26 RX ORDER — ASCORBIC ACID 1000 MG
10 TABLET ORAL
Refills: 0 | Status: COMPLETED | COMMUNITY
End: 2022-08-26

## 2022-08-26 RX ORDER — PSYLLIUM HUSK 0.4 G
CAPSULE ORAL
Refills: 0 | Status: COMPLETED | COMMUNITY
End: 2022-08-26

## 2022-08-26 RX ORDER — MULTIVITAMIN
TABLET ORAL
Refills: 0 | Status: COMPLETED | COMMUNITY
End: 2022-08-26

## 2022-08-26 RX ORDER — ASPIRIN 81 MG
6.5 TABLET, DELAYED RELEASE (ENTERIC COATED) ORAL
Qty: 1 | Refills: 0 | Status: COMPLETED | COMMUNITY
Start: 2021-09-15 | End: 2022-08-26

## 2022-08-28 ENCOUNTER — NON-APPOINTMENT (OUTPATIENT)
Age: 67
End: 2022-08-28

## 2022-08-30 LAB
24R-OH-CALCIDIOL SERPL-MCNC: 49.2 PG/ML
ANION GAP SERPL CALC-SCNC: 11 MMOL/L
APPEARANCE: CLEAR
APTT BLD: 33.5 SEC
BASOPHILS # BLD AUTO: 0.05 K/UL
BASOPHILS NFR BLD AUTO: 0.8 %
BILIRUBIN URINE: NEGATIVE
BLOOD URINE: NEGATIVE
BUN SERPL-MCNC: 20 MG/DL
CALCIUM SERPL-MCNC: 10.1 MG/DL
CHLORIDE SERPL-SCNC: 105 MMOL/L
CHOLEST SERPL-MCNC: 261 MG/DL
CO2 SERPL-SCNC: 25 MMOL/L
COLOR: NORMAL
CREAT SERPL-MCNC: 0.94 MG/DL
EGFR: 90 ML/MIN/1.73M2
EOSINOPHIL # BLD AUTO: 0.69 K/UL
EOSINOPHIL NFR BLD AUTO: 11.7 %
FOLATE SERPL-MCNC: 17.1 NG/ML
GLUCOSE QUALITATIVE U: NEGATIVE
GLUCOSE SERPL-MCNC: 100 MG/DL
HCT VFR BLD CALC: 47.4 %
HDLC SERPL-MCNC: 50 MG/DL
HGB BLD-MCNC: 15.7 G/DL
IMM GRANULOCYTES NFR BLD AUTO: 0.3 %
INR PPP: 0.97 RATIO
IRON SATN MFR SERPL: 39 %
IRON SERPL-MCNC: 122 UG/DL
KETONES URINE: NEGATIVE
LDLC SERPL CALC-MCNC: 191 MG/DL
LEUKOCYTE ESTERASE URINE: NEGATIVE
LYMPHOCYTES # BLD AUTO: 1.23 K/UL
LYMPHOCYTES NFR BLD AUTO: 20.8 %
MAN DIFF?: NORMAL
MCHC RBC-ENTMCNC: 31.9 PG
MCHC RBC-ENTMCNC: 33.1 GM/DL
MCV RBC AUTO: 96.3 FL
MONOCYTES # BLD AUTO: 0.88 K/UL
MONOCYTES NFR BLD AUTO: 14.9 %
NEUTROPHILS # BLD AUTO: 3.04 K/UL
NEUTROPHILS NFR BLD AUTO: 51.5 %
NITRITE URINE: NEGATIVE
NONHDLC SERPL-MCNC: 211 MG/DL
PH URINE: 6.5
PLATELET # BLD AUTO: 219 K/UL
POTASSIUM SERPL-SCNC: 5.7 MMOL/L
PROTEIN URINE: NORMAL
PT BLD: 11.4 SEC
RBC # BLD: 4.92 M/UL
RBC # FLD: 13.5 %
SODIUM SERPL-SCNC: 141 MMOL/L
SPECIFIC GRAVITY URINE: 1.02
TIBC SERPL-MCNC: 310 UG/DL
TRIGL SERPL-MCNC: 100 MG/DL
UIBC SERPL-MCNC: 188 UG/DL
UROBILINOGEN URINE: NORMAL
VIT B12 SERPL-MCNC: 772 PG/ML
WBC # FLD AUTO: 5.91 K/UL

## 2022-08-31 ENCOUNTER — APPOINTMENT (OUTPATIENT)
Dept: ORTHOPEDIC SURGERY | Facility: CLINIC | Age: 67
End: 2022-08-31

## 2022-08-31 DIAGNOSIS — S43.431A SUPERIOR GLENOID LABRUM LESION OF RIGHT SHOULDER, INITIAL ENCOUNTER: ICD-10-CM

## 2022-08-31 PROCEDURE — 99213 OFFICE O/P EST LOW 20 MIN: CPT

## 2022-08-31 PROCEDURE — 73502 X-RAY EXAM HIP UNI 2-3 VIEWS: CPT | Mod: RT

## 2022-09-02 ENCOUNTER — APPOINTMENT (OUTPATIENT)
Dept: CARDIOLOGY | Facility: CLINIC | Age: 67
End: 2022-09-02

## 2022-09-02 ENCOUNTER — NON-APPOINTMENT (OUTPATIENT)
Age: 67
End: 2022-09-02

## 2022-09-02 VITALS
HEIGHT: 69 IN | SYSTOLIC BLOOD PRESSURE: 112 MMHG | BODY MASS INDEX: 24.1 KG/M2 | HEART RATE: 47 BPM | WEIGHT: 162.7 LBS | DIASTOLIC BLOOD PRESSURE: 58 MMHG | OXYGEN SATURATION: 98 %

## 2022-09-02 PROCEDURE — 99214 OFFICE O/P EST MOD 30 MIN: CPT | Mod: 25

## 2022-09-02 PROCEDURE — 93000 ELECTROCARDIOGRAM COMPLETE: CPT

## 2022-09-06 ENCOUNTER — APPOINTMENT (OUTPATIENT)
Dept: CARDIOLOGY | Facility: CLINIC | Age: 67
End: 2022-09-06

## 2022-09-07 ENCOUNTER — NON-APPOINTMENT (OUTPATIENT)
Age: 67
End: 2022-09-07

## 2022-09-08 ENCOUNTER — NON-APPOINTMENT (OUTPATIENT)
Age: 67
End: 2022-09-08

## 2022-09-08 PROBLEM — S43.431A SUPERIOR GLENOID LABRUM LESION OF RIGHT SHOULDER, INITIAL ENCOUNTER: Status: ACTIVE | Noted: 2022-08-31

## 2022-09-08 PROCEDURE — 93224 XTRNL ECG REC UP TO 48 HRS: CPT

## 2022-09-08 NOTE — HISTORY OF PRESENT ILLNESS
[de-identified] : Reed comes in today with complaints of pain to his right shoulder and his right shoulder.

## 2022-09-08 NOTE — REASON FOR VISIT
[Follow-Up Visit] : a follow-up visit for [FreeTextEntry2] : his right shoulder and a new concern to his right hip.

## 2022-09-08 NOTE — DISCUSSION/SUMMARY
[de-identified] : The patient presents with 1.) impingement, SLAP and biceps tendinitis, right shoulder and 2.) osteoarthritis, right hip.  At this time start on a course of physical therapy and anti-inflammatory.  He will be reassessed in four to six weeks.\par

## 2022-09-08 NOTE — ADDENDUM
[FreeTextEntry1] : This note was written by Ann-Marie Talley on 09/08/2022 acting as scribe for Samson Carrington III, MD

## 2022-09-08 NOTE — ASSESSMENT
[FreeTextEntry1] : A/P:\par \par *preop clearance\par -HRs run in 45-50 range, likely 2/2 high activity\par level & slight variation from normal.\par \par -Holter ordered to evaluate average HRs & confirm no\par other arrhythmia issues this was planned last visit but was\par not completed.\par -pt will call after holter complete - gave him my cell phone to call me.\par \par No return visit scheduled.\par ==================\par 9/8/'22\par \par Reviewed holter monitor.\par Ave heart rate 59. no significant arrhythmias.\par May proceed w/ surgery w/o further cardiac testing.\par Low risk for cardiac events perioperatively.

## 2022-09-08 NOTE — HISTORY OF PRESENT ILLNESS
[FreeTextEntry1] : 67 Y/o gentleman PMH: HLD, Depression followed with Psychiatry, B/L carpal tunnel release \par referred for evaluation of Sinus Bradycardia Sep '21\par here for preop eval. for ACL knee surgery\par \par PCP referred b/c of baseline sinus bradycardia w/ HRs 47\par previously evaluated w/ extensively including exercise stress test & echo\par & no abnormalities.  Holter planned, but could not reach pt to schedule (see prior note)\par \par Since then no changes.  exercises 7 days a week.\par Does elliptical ERG stair stepper, low impact activity w/o difficulty.\par No chest discomfort, dyspnea, palpitations, lightheadness, syncope.\par Discussed holter monitor that was planned last visit.\par \par tolerated two other similar surgeries with low HR\par w/o problems.\par \par Semi retired coporate training,\par former banker, .\par

## 2022-09-08 NOTE — PHYSICAL EXAM
[de-identified] : Left shoulder:\par Shoulder: Range of Motion in Degrees:   	\par    	                        Claimant:  	Normal:  	\par Abduction (Active)  	180  	180 degrees  	\par Abduction (Passive)  	180  	180 degrees  	\par Forward elevation (Active):  	180  	180 degrees  	\par Forward elevation (Passive):  180  	180 degrees  	\par External rotation (Active):  	45  	45 degrees  	\par External rotation (Passive):  	45  	45 degrees  	\par Internal rotation (Active):  	L-1  	L-1  	\par Internal rotation (Passive):  	L-1  	L-1  \par 	\par No motor weakness to internal rotation, external rotation or abduction in the scapular plane.  Negative crank test.  Negative O’Alejandro’s test.  Negative Speed’s test. Negative Yergason’s test.  Negative cross arm test.  No tenderness to palpation at the AC joint. Negative Hawkin’s sign.  Negative Neer’s sign.  Negative apprehension. Negative sulcus sign.  No gross neurological or vascular deficits distally.  Skin is intact.  No rashes, scars or lesions. 2+ radial and ulnar pulses. No extra-articular swelling or tenderness.\par  \par Right shoulder:\par Shoulder: Range of Motion in Degrees:	\par 	                                  Claimant:	Normal:	\par Abduction (Active)	                  180	               180 degrees	\par Abduction (Passive)	  180	               180 degrees	\par Forward elevation (Active):	  180	               180 degrees	\par Forward elevation (Passive):	  180	               180 degrees	\par External rotation (Active):	   45	               45 degrees	\par External rotation (Passive):	   45	               45 degrees	\par Internal rotation (Active):	   L-1	               L-1	\par Internal rotation (Passive):	   L-1	               L-1	\par  \par No motor weakness to internal rotation, external rotation or abduction in the scapular plane.  Positive crank test.  Positive O’Alejandro’s test. Positive Speed’s test.  Positive Yergason’s test.  Negative cross arm test.  No tenderness to palpation at the AC joint. Positive Hawkin’s sign.  Positive Neer’s sign. Negative apprehension. Negative sulcus sign.  No gross neurological or vascular deficits distally.  Skin is intact.  No rashes, scars or lesions. 2+ radial and ulnar pulses. No extra-articular swelling or tenderness.\par \par Left hip:\par Hip: Range of Motion in Degrees:\par 	                                 Claimant:	   Normal:	\par Flexion (Active) 	                 120 	   120-degrees	\par Flexion (Passive)	                 120	   120-degrees	\par Extension (Active)	                 -30	   -30-degrees	\par Extension (Passive)	 -30	   -30-degrees	\par Abduction (Active)	                 45-50	   80-57-[id number]	\par Abduction (Passive)	 45-50	   91-87-oluguyq	\par Adduction (Active)  	 20-30	   67-74-xirribi	\par Adduction (Passive)	 20-30	   92-51-nmyohvj	\par Internal Rotation (Active) 	 35	   35-degrees	\par Internal Rotation (Passive)	 35	   35-degrees	\par External Rotation (Active)	 45	   45-degrees	\par External Rotation (Passive)	 45	   45-degrees	\par \par No tenderness with internal or external rotation or axial load.  No tenderness to palpation over the greater trochanter.  Negative Trendelenburg.  No tenderness with resisted abduction.  No weakness to flexion, extension, abduction or adduction.  No evidence of instability.  No motor or sensory deficits.  2+ DP and PT pulses.  Skin is intact.  No scars, rashes or lesions.  \par  \par Right hip:\par Hip: Range of Motion in Degrees:\par 	                                  Claimant:	Normal:	\par Flexion (Active) 	                  120 	                120-degrees	\par Flexion (Passive)	                  120	                120-degrees	\par Extension (Active)	                  -30	                -30-degrees	\par Extension (Passive)	  -30	                -30-degrees	\par Abduction (Active)	                  45-50	                33-72-eodsifp	\par Abduction (Passive)	  45-50	                49-47-lajxtnd	\par Adduction (Active)	                  20-30	                40-64-ajvfoxm	\par Adduction (Passive)	  20-30	                97-78-kyelmhz	\par Internal Rotation (Active) 	 10	                35-degrees	\par Internal Rotation (Passive)	 10	                35-degrees	\par External Rotation (Active)	 45	                45-degrees	\par External Rotation (Passive)	 45	                45-degrees	\par \par Tenderness into the groin with internal and external rotation and axial load.  No tenderness to palpation over the greater trochanter.  Negative Trendelenburg.  No tenderness with resisted abduction.  No weakness to flexion, extension, abduction or adduction.  No evidence of instability.  No motor or sensory deficits.  2+ DP and PT pulses.  Skin is intact.  No scars, rashes or lesions.  \par   [de-identified] : Gait: Slightly antalgic to antalgic gait.  Station: Normal  [de-identified] : Appearance: Well-developed, well-nourished male  in no acute distress.  [de-identified] : Radiographs taken in the office today, two to three views of the right hip, including pelvis, show moderate degenerative changes.\par \par

## 2022-09-13 NOTE — ASSESSMENT
[As per surgery] : as per surgery [Patient Optimized for Surgery] : Patient optimized for surgery [FreeTextEntry4] : \par COVID PCR per surgery \par \par  See  cardiac clearance 8/30/22 \par \par Advised to hold all NSAID's including ASA, Advil, Motrin, supplements, herbals  7 days prior.  [FreeTextEntry2] : Close airway monitoring and oxygen saturation is required

## 2022-09-13 NOTE — REVIEW OF SYSTEMS
[Negative] : Psychiatric [Fever] : no fever [Chills] : no chills [Fatigue] : no fatigue [FreeTextEntry9] : see HPI

## 2022-09-13 NOTE — HISTORY OF PRESENT ILLNESS
[Sleep Apnea] : sleep apnea [No Adverse Anesthesia Reaction] : no adverse anesthesia reaction in self or family member [(Patient denies any chest pain, claudication, dyspnea on exertion, orthopnea, palpitations or syncope)] : Patient denies any chest pain, claudication, dyspnea on exertion, orthopnea, palpitations or syncope [Aortic Stenosis] : no aortic stenosis [Atrial Fibrillation] : no atrial fibrillation [Coronary Artery Disease] : no coronary artery disease [Recent Myocardial Infarction] : no recent myocardial infarction [Implantable Device/Pacemaker] : no implantable device/pacemaker [Asthma] : no asthma [COPD] : no COPD [Smoker] : not a smoker [Chronic Anticoagulation] : no chronic anticoagulation [Chronic Kidney Disease] : no chronic kidney disease [Diabetes] : no diabetes [FreeTextEntry1] : left knee medial meniscotomy  [FreeTextEntry2] : 9/13/22 [FreeTextEntry3] : Dr. Juanita Marie  [FreeTextEntry4] : Pt is a 67 yr. old male here for preop evaluation for planned left knee medial meniscectomy. He reports pain for 9 months, decreased roange of motion an difficulty with ADL's \par MRI showed horizontal tear of the posterior horn of the medial meniscus. \par Pt is scheduled for cardiac clearance 8/30/22, Dr. Spencer. H/ o of sinus bradycardia .  \par Pt denies fever, chills, Chest pain, palpitations, shortness of breath.   [FreeTextEntry5] : mild Sleep apnea diagnosed 9/21- does not uses CPAP

## 2022-09-13 NOTE — PHYSICAL EXAM
[No Acute Distress] : no acute distress [Well Nourished] : well nourished [Well Developed] : well developed [Normal TMs] : both tympanic membranes were normal [Supple] : supple [No Respiratory Distress] : no respiratory distress  [No Accessory Muscle Use] : no accessory muscle use [Clear to Auscultation] : lungs were clear to auscultation bilaterally [Normal Rate] : normal rate  [Regular Rhythm] : with a regular rhythm [Normal S1, S2] : normal S1 and S2 [Pedal Pulses Present] : the pedal pulses are present [No Extremity Clubbing/Cyanosis] : no extremity clubbing/cyanosis [Soft] : abdomen soft [Non Tender] : non-tender [Non-distended] : non-distended [Normal Bowel Sounds] : normal bowel sounds [No Rash] : no rash [Coordination Grossly Intact] : coordination grossly intact [No Focal Deficits] : no focal deficits [Normal Gait] : normal gait [Normal Affect] : the affect was normal [Alert and Oriented x3] : oriented to person, place, and time [Normal Insight/Judgement] : insight and judgment were intact [de-identified] : left knee decreased ROM due to pain

## 2022-10-26 NOTE — ASU DISCHARGE PLAN (ADULT/PEDIATRIC) - PAIN MANAGEMENT
Interval History: More encephalopathic in the setting of a new UTI. BP holding up after resucitation yesterday. Starting cefepime and continuing Vanc/Flagyl    Review of Systems   Unable to perform ROS: Mental status change   Objective:     Vital Signs (Most Recent):  Temp: 97.3 °F (36.3 °C) (10/26/22 1204)  Pulse: 70 (10/26/22 1219)  Resp: 20 (10/26/22 1204)  BP: (!) 90/55 (10/26/22 1204)  SpO2: (!) 94 % (10/26/22 1204)   Vital Signs (24h Range):  Temp:  [97.2 °F (36.2 °C)-98 °F (36.7 °C)] 97.3 °F (36.3 °C)  Pulse:  [52-72] 70  Resp:  [14-20] 20  SpO2:  [94 %-98 %] 94 %  BP: ()/(50-64) 90/55     Weight: 83.5 kg (184 lb)  Body mass index is 26.4 kg/m².    Intake/Output Summary (Last 24 hours) at 10/26/2022 1435  Last data filed at 10/26/2022 1402  Gross per 24 hour   Intake 289.92 ml   Output 350 ml   Net -60.08 ml      Physical Exam  Constitutional:       Appearance: He is not ill-appearing.   HENT:      Head:      Comments: Subcutaneous lesion hard to palpation located on right frontal skull.   Eyes:      General: No scleral icterus.     Conjunctiva/sclera: Conjunctivae normal.   Cardiovascular:      Rate and Rhythm: Normal rate and regular rhythm.      Pulses: Normal pulses.      Heart sounds: Normal heart sounds.   Pulmonary:      Effort: Pulmonary effort is normal. No respiratory distress.      Breath sounds: Normal breath sounds.   Chest:      Comments: Pacemaker in place in right upper chest wall.   Abdominal:      General: Bowel sounds are normal. There is distension.      Palpations: Abdomen is soft.      Tenderness: There is no abdominal tenderness.   Musculoskeletal:      Right lower leg: Edema present.      Left lower leg: Edema present.   Skin:     General: Skin is warm and dry.      Findings: No bruising or rash.   Neurological:      GCS: GCS eye subscore is 4. GCS verbal subscore is 3. GCS motor subscore is 5.      Comments: Disoriented, obtunded        Significant Labs: All pertinent labs  within the past 24 hours have been reviewed.    Significant Imaging: I have reviewed all pertinent imaging results/findings within the past 24 hours.   Prescriptions electronically submitted to pharmacy from [facility]

## 2022-11-01 ENCOUNTER — RESULT CHARGE (OUTPATIENT)
Age: 67
End: 2022-11-01

## 2022-11-02 ENCOUNTER — APPOINTMENT (OUTPATIENT)
Dept: ORTHOPEDIC SURGERY | Facility: CLINIC | Age: 67
End: 2022-11-02

## 2022-11-02 VITALS
HEIGHT: 69 IN | SYSTOLIC BLOOD PRESSURE: 130 MMHG | HEART RATE: 56 BPM | WEIGHT: 162 LBS | DIASTOLIC BLOOD PRESSURE: 84 MMHG | BODY MASS INDEX: 23.99 KG/M2

## 2022-11-02 PROCEDURE — 99213 OFFICE O/P EST LOW 20 MIN: CPT

## 2022-11-03 NOTE — PHYSICAL EXAM
[de-identified] : Right hip:\par Hip: Range of Motion in Degrees:\par 	   Claimant:	Normal:	\par Flexion (Active) 	  120 	 120-degrees	\par Flexion (Passive)	  120	 120-degrees	\par Extension (Active)	  -30	 -30-degrees	\par Extension (Passive)	 -30	 -30-degrees	\par Abduction (Active)	  45-50	 30-46-dabxdto	\par Abduction (Passive)	 45-50	 83-99-rwpnckz	\par Adduction (Active)	  20-30	 02-79-rvyzfpk	\par Adduction (Passive)	 20-30	 15-42-eiukcdx	\par Internal Rotation (Active) 	 10	 35-degrees	\par Internal Rotation (Passive)	 10	 35-degrees	\par External Rotation (Active)	 45	 45-degrees	\par External Rotation (Passive)	 45	 45-degrees	\par \par Tenderness into the groin with internal and external rotation and axial load. No tenderness to palpation over the greater trochanter. Negative Trendelenburg. No tenderness with resisted abduction. No weakness to flexion, extension, abduction or adduction. No evidence of instability. No motor or sensory deficits. 2+ DP and PT pulses. Skin is intact. No scars, rashes or lesions. \par  [de-identified] : Gait and Station:  Ambulating with a slightly antalgic to antalgic gait.  Station:  Normal.  [de-identified] : Appearance:  Well-developed, well-nourished male in no acute distress.\par

## 2022-11-03 NOTE — DISCUSSION/SUMMARY
[de-identified] : At this time, due to osteoarthritis of the right hip, the patient will be sent for an intra-articular injection.  He will be reassessed in one month.

## 2022-11-03 NOTE — HISTORY OF PRESENT ILLNESS
[de-identified] : The patient comes in today stating although he is a little bit better, he is still having some pain.

## 2022-11-03 NOTE — ADDENDUM
[FreeTextEntry1] : This note was written by Cortney Schaefer on 11/03/2022 acting as scribe for Samson Carrington III, MD

## 2022-11-22 DIAGNOSIS — M19.011 PRIMARY OSTEOARTHRITIS, RIGHT SHOULDER: ICD-10-CM

## 2022-11-22 DIAGNOSIS — M75.21 BICIPITAL TENDINITIS, RIGHT SHOULDER: ICD-10-CM

## 2022-11-22 DIAGNOSIS — M75.41 IMPINGEMENT SYNDROME OF RIGHT SHOULDER: ICD-10-CM

## 2022-12-27 ENCOUNTER — APPOINTMENT (OUTPATIENT)
Dept: INTERNAL MEDICINE | Facility: CLINIC | Age: 67
End: 2022-12-27

## 2022-12-27 DIAGNOSIS — R53.83 OTHER FATIGUE: ICD-10-CM

## 2022-12-27 DIAGNOSIS — K21.9 GASTRO-ESOPHAGEAL REFLUX DISEASE W/OUT ESOPHAGITIS: ICD-10-CM

## 2022-12-27 PROCEDURE — 99213 OFFICE O/P EST LOW 20 MIN: CPT | Mod: 95

## 2022-12-27 NOTE — HISTORY OF PRESENT ILLNESS
[FreeTextEntry1] : FU [de-identified] : GA HARO is a 67 year M who calls in for a follow up visit.\par Pt notes feeling fatigue and tired over the last 1 month. He did have sleep apnea diagnosed last yr and recommended to get oral appliance but was not able to due to insurance reasons. \par He is now scheduled for hip surgery and following with new pulm and having repeat sleep study again. \par He had URI 2 weeks ago with cough, rhinorrhea and diarrhea as well. Covid testing x 3 at home negative. \par Patient denies any chest pain, shortness of breath, headache, abdominal pain, nausea, vomiting, palpitations, constipation, diarrhea or loss of sense of taste/smell.\par

## 2022-12-27 NOTE — ASSESSMENT
[FreeTextEntry1] : 1.Fatigue: Discussed blood work to get. Advised could be due to untreated sleep apnea and agree with repeat sleep study to see if he needs CPAP vs oral appliance. \par \par 2.GERD: advised stop drinking 4-5x coffee which he already has x 1 week, start pepcid 20 mg daily, GERD diet and US abdomen if pain does not resolve in 1 week.

## 2023-01-16 LAB
ALBUMIN SERPL ELPH-MCNC: 4.4 G/DL
ALP BLD-CCNC: 134 U/L
ALT SERPL-CCNC: 48 U/L
ANION GAP SERPL CALC-SCNC: 12 MMOL/L
AST SERPL-CCNC: 27 U/L
BASOPHILS # BLD AUTO: 0.05 K/UL
BASOPHILS NFR BLD AUTO: 0.7 %
BILIRUB SERPL-MCNC: 0.3 MG/DL
BUN SERPL-MCNC: 22 MG/DL
CALCIUM SERPL-MCNC: 9.7 MG/DL
CHLORIDE SERPL-SCNC: 102 MMOL/L
CO2 SERPL-SCNC: 27 MMOL/L
CREAT SERPL-MCNC: 1.31 MG/DL
EGFR: 60 ML/MIN/1.73M2
EOSINOPHIL # BLD AUTO: 0.28 K/UL
EOSINOPHIL NFR BLD AUTO: 4 %
ESTIMATED AVERAGE GLUCOSE: 111 MG/DL
GLUCOSE SERPL-MCNC: 93 MG/DL
HBA1C MFR BLD HPLC: 5.5 %
HCT VFR BLD CALC: 47 %
HGB BLD-MCNC: 15.9 G/DL
IMM GRANULOCYTES NFR BLD AUTO: 1.4 %
LYMPHOCYTES # BLD AUTO: 1.32 K/UL
LYMPHOCYTES NFR BLD AUTO: 19 %
MAN DIFF?: NORMAL
MCHC RBC-ENTMCNC: 32 PG
MCHC RBC-ENTMCNC: 33.8 GM/DL
MCV RBC AUTO: 94.6 FL
MONOCYTES # BLD AUTO: 0.79 K/UL
MONOCYTES NFR BLD AUTO: 11.4 %
NEUTROPHILS # BLD AUTO: 4.41 K/UL
NEUTROPHILS NFR BLD AUTO: 63.5 %
PLATELET # BLD AUTO: 249 K/UL
POTASSIUM SERPL-SCNC: 4.6 MMOL/L
PROT SERPL-MCNC: 6.7 G/DL
PSA SERPL-MCNC: 0.49 NG/ML
RBC # BLD: 4.97 M/UL
RBC # FLD: 13.4 %
SODIUM SERPL-SCNC: 141 MMOL/L
TSH SERPL-ACNC: 2.16 UIU/ML
VIT B12 SERPL-MCNC: 896 PG/ML
WBC # FLD AUTO: 6.95 K/UL

## 2023-01-17 ENCOUNTER — NON-APPOINTMENT (OUTPATIENT)
Age: 68
End: 2023-01-17

## 2023-02-07 ENCOUNTER — NON-APPOINTMENT (OUTPATIENT)
Age: 68
End: 2023-02-07

## 2023-02-07 ENCOUNTER — APPOINTMENT (OUTPATIENT)
Dept: ORTHOPEDIC SURGERY | Facility: CLINIC | Age: 68
End: 2023-02-07
Payer: MEDICARE

## 2023-02-07 VITALS
BODY MASS INDEX: 23.85 KG/M2 | DIASTOLIC BLOOD PRESSURE: 85 MMHG | HEIGHT: 69 IN | SYSTOLIC BLOOD PRESSURE: 135 MMHG | WEIGHT: 161 LBS | HEART RATE: 54 BPM

## 2023-02-07 PROCEDURE — 99214 OFFICE O/P EST MOD 30 MIN: CPT

## 2023-02-07 PROCEDURE — 73502 X-RAY EXAM HIP UNI 2-3 VIEWS: CPT | Mod: RT

## 2023-02-07 NOTE — REVIEW OF SYSTEMS
[Arthralgia] : arthralgia [Joint Pain] : joint pain [Joint Stiffness] : joint stiffness [Joint Swelling] : joint swelling [Negative] : Heme/Lymph [FreeTextEntry9] : Generalized arthritis [FreeTextEntry5] : History of bradycardia [FreeTextEntry6] : Sleep apnea [FreeTextEntry7] : Gastroesophageal reflux disease

## 2023-02-07 NOTE — PHYSICAL EXAM
[Antalgic] : antalgic [UE/LE] : Sensory: Intact in bilateral upper & lower extremities [ALL] : dorsalis pedis, posterior tibial, femoral, popliteal, and radial 2+ and symmetric bilaterally [Normal RUE] : Right Upper Extremity: No scars, rashes, lesions, ulcers, skin intact [Normal LUE] : Left Upper Extremity: No scars, rashes, lesions, ulcers, skin intact [Normal RLE] : Right Lower Extremity: No scars, rashes, lesions, ulcers, skin intact [Normal LLE] : Left Lower Extremity: No scars, rashes, lesions, ulcers, skin intact [Normal] : No costovertebral angle tenderness and no spinal tenderness [de-identified] : General/Constitutional: Well appearing, 67year old male 5/5 in no apparent distress; height and weight as detailed below; vital signs as detailed below\par \par Neuro:\par Orientation/Mental Status: Awake, alert, oriented to time, place, & person.\par Balance: Single leg balance intact on Left / Right @ 10 sec.\par Sensation: intact to fine & deep touch bilat. Feet/toes; \par EHL/AT(Peroneal N.): Bilat: 5/5\par \par Vascular: DP: Bilat: 2+\par Cap refill 1-2 sec. all toes\par Lymph: No enlarged LN in the popliteal chain bilaterally.\par Skin(LE): No cellulitis, edema, and min. venous varicosities bilaterally\par  \par MS:\par Gait: The patient has a stable right-sided antalgic limp using a \par No assistive devices\par Function: There is minimal difficulty mounting the exam table.\par Leg Lengths: On exam the heels reveal relatively equal in the supine position which is confirmed at the medial malleoli. \par \par Knees: Both knees have no visible swelling, palpable effusion, or joint tenderness. Both have full active and passive flexion and extension on ROM exam without pain . No instability to medial & lateral stresses noted. Quadriceps/hamstring strength was 5/5 bilaterally.  Patient does have medial joint line tenderness positive Pauline on the left.  Range of motion of the left knee 0-1 30.\par Fer sign negative\par Trendelenberg sign: Negative\par There is [ no] rotatory positioning of the pelvis \par The [right and left] hip has no tenderness in the trochanteric bursa or the groin.\par \par Left Hip ROM:\par SLR= 60 deg.; Flexion= 105 deg.; Extension= 0 deg.; Ext. Rot.= 40 deg.;\par Int. Rot.= 35 deg.; Abduction= 20 deg.; Adduction= 15 deg.\par \par Right  Hip ROM:\par SLR= 50 deg.; Flexion= XX 90 deg.; Extension= 0 deg.; Ext. Rot.= 15 deg.;\par Int. Rot.= 5 deg.; Abduction= 20 deg.; Adduction= 10 deg.\par \par Strength: Hip Flexor/Extensor St.= Bilat: 5/5\par There is moderate right side pain on ROM endpoints. \par There is significant stiffness on ROM endpoints.  Right side\par Stability: No gross klunk/instability with ROM bilat. Hips.\par :\par  [de-identified] : Intact [de-identified] : Severe degenerative joint disease to the right hip with multiple subchondral cyst, periarticular osteophytes, bone-on-bone contact acetabulum the femoral head

## 2023-02-07 NOTE — HISTORY OF PRESENT ILLNESS
[de-identified] : The patient is a 67-year-old gentleman with multiple comorbidities who presents with severe and debilitating right hip pain affecting his activities of daily living for many years getting more pronounced over the past several months.  Patient did see another orthopedic surgeon and booked for surgery however canceled due to the need for pulmonary clearance for new CPAP machine for sleep apnea.  Patient has difficulty putting his shoes and socks on and getting of a car.  He is taken anti-inflammatories, physical therapy with no relief.  Patient states he had ultrasound-guided cortisone injections with relief for just a few months.  He tells me the pain is mostly in the groin and radiates down the anterior thigh.  He has difficulty standing for long periods of time, walking for long peers of time, negotiating stairs.  Patient is here to seek a definitive procedure for the pain and discomfort he is having in his right hip.  Patient also states that he was having some left knee problems he did have an MRI which showed a medial meniscal tear but also degenerative changes. [Worsening] : worsening [9] : a current pain level of 9/10 [7] : an average pain level of 7/10 [5] : a minimum pain level of 5/10 [10] : a maximum pain level of 10/10 [Standing] : standing [Daily] : ~He/She~ states the symptoms seem to be occuring daily [Bending] : worsened by bending [Hip Movement] : worsened by hip movement [Lifting] : worsened by lifting [Sitting] : worsened by sitting [Running] : worsened by running [Walking] : worsened by walking [Acetaminophen] : relieved by acetaminophen [Exercise Regimen] : relieved by exercise regimen [Heat] : relieved by heat [Ice] : relieved by ice [NSAIDs] : relieved by nonsteroidal anti-inflammatory drugs [Physical Therapy] : relieved by physical therapy [Rest] : relieved by rest [Ataxia] : no ataxia [Incontinence] : no incontinence [Loss of Dexterity] : good dexterity [Urinary Ret.] : no urinary retention

## 2023-02-07 NOTE — DISCUSSION/SUMMARY
[Medication Risks Reviewed] : Medication risks reviewed [Surgical risks reviewed] : Surgical risks reviewed [de-identified] : I have  seen and evaluated this patient and is guiding this patient’s entire orthopedic management plan. The patient was advised that based upon their clinical presentation and radiographic findings they meet inclusion criteria for benefitting from an Anterior approach(ASI) total hip arthroplasty as a treatment option for severe arthritis of their [side] hip.\par The spectrum of treatments for severe hip DJD were reviewed in detail. I reviewed in detail the goals, alternatives, risks and benefits of ASI total hip arthroplasty. \par The patient was advised of the possible complications which are inclusive of but not exclusive to VTE-related, infectious-related, anesthetic-related, surgically-related, Lateral Femoral Cutaneous nerve injury-related, medically-related, and implant-related.\par The patient was advised that limb length equality may not be assured secondary to variabilities in pelvic malrotation, stable intraoperative fit, opposite side wear, and other anatomic deformities of the lower extremity.\par The patient was advised of the goals, alternatives, risks and benefits of autologous, directed and banked blood product transfusions for perioperative blood losses.\par A non-cemented type hip implant is utilized in consideration of bony integrity intraoperatively. \par The patient was educated regarding the surgical procedure steps, especially using an Hip implant and bone model, as well as steps in their hospital course and primary discharge planning for home discharge with home care and home PT. Choices for implant 's, mainly DJO and Biomet-Janae were reviewed, with selection to be made by surgeon intraoperatively.\par The patient was advised of the goals, alternatives, risks and benefits of a 3 week course of Celebrex 200 mg BID utilized by Dr. Loredo in their practice to prevent Heterotopic Ossification seen in patients post total hip arthroplasty. If this is medically contraindicated the alternative would be a single dose (800cGy) radiation treatment to the hip implant site performed pre-operatively. A consultation with the Radiation Oncologist at [facility] will then be required.\par I reviewed the plan of care as well as a model of the Hip implant equivalent to the one that will be used for the right THR. The patient agreed to the plan of care as well as the use of implants for their right THR.\par The patient was advised that they will require a medical preoperative risk evaluation by their PCP. Further medical subspecialty clearances such as cardiac may be indicated if felt needed by their PCP.\par The patient was advised he/she may call our office after careful consideration of their treatment options and further consultation\par The patient was thoroughly educated using models and the actual implant.  All of the patient's questions were answered to their satisfaction.  The patient would need a bedside commode and a rolling walker for use for activities of daily living status post a total joint replacement .  The patient needs cardiac/pulmonary and medical clearance prior to surgery.  All of his questions were answered to his satisfaction.\par

## 2023-02-07 NOTE — REASON FOR VISIT
[Hip Pain] : hip pain [Osteoarthritis, Hip] : osteoarthritis of the hip [FreeTextEntry2] : Severe right hip pain

## 2023-02-27 ENCOUNTER — RX RENEWAL (OUTPATIENT)
Age: 68
End: 2023-02-27

## 2023-03-14 ENCOUNTER — OUTPATIENT (OUTPATIENT)
Dept: OUTPATIENT SERVICES | Facility: HOSPITAL | Age: 68
LOS: 1 days | End: 2023-03-14
Payer: MEDICARE

## 2023-03-14 VITALS
HEIGHT: 69 IN | SYSTOLIC BLOOD PRESSURE: 105 MMHG | TEMPERATURE: 98 F | RESPIRATION RATE: 15 BRPM | DIASTOLIC BLOOD PRESSURE: 65 MMHG | WEIGHT: 164.91 LBS | OXYGEN SATURATION: 97 % | HEART RATE: 53 BPM

## 2023-03-14 DIAGNOSIS — Z98.890 OTHER SPECIFIED POSTPROCEDURAL STATES: Chronic | ICD-10-CM

## 2023-03-14 DIAGNOSIS — Z01.818 ENCOUNTER FOR OTHER PREPROCEDURAL EXAMINATION: ICD-10-CM

## 2023-03-14 DIAGNOSIS — Z90.89 ACQUIRED ABSENCE OF OTHER ORGANS: Chronic | ICD-10-CM

## 2023-03-14 DIAGNOSIS — M16.11 UNILATERAL PRIMARY OSTEOARTHRITIS, RIGHT HIP: ICD-10-CM

## 2023-03-14 LAB
A1C WITH ESTIMATED AVERAGE GLUCOSE RESULT: 5.5 % — SIGNIFICANT CHANGE UP (ref 4–5.6)
ALBUMIN SERPL ELPH-MCNC: 3.8 G/DL — SIGNIFICANT CHANGE UP (ref 3.3–5)
ALP SERPL-CCNC: 130 U/L — HIGH (ref 30–120)
ALT FLD-CCNC: 33 U/L DA — SIGNIFICANT CHANGE UP (ref 10–60)
ANION GAP SERPL CALC-SCNC: 5 MMOL/L — SIGNIFICANT CHANGE UP (ref 5–17)
APTT BLD: 30.7 SEC — SIGNIFICANT CHANGE UP (ref 27.5–35.5)
AST SERPL-CCNC: 20 U/L — SIGNIFICANT CHANGE UP (ref 10–40)
BILIRUB SERPL-MCNC: 0.4 MG/DL — SIGNIFICANT CHANGE UP (ref 0.2–1.2)
BLD GP AB SCN SERPL QL: SIGNIFICANT CHANGE UP
BUN SERPL-MCNC: 16 MG/DL — SIGNIFICANT CHANGE UP (ref 7–23)
CALCIUM SERPL-MCNC: 8.9 MG/DL — SIGNIFICANT CHANGE UP (ref 8.4–10.5)
CHLORIDE SERPL-SCNC: 108 MMOL/L — SIGNIFICANT CHANGE UP (ref 96–108)
CO2 SERPL-SCNC: 30 MMOL/L — SIGNIFICANT CHANGE UP (ref 22–31)
CREAT SERPL-MCNC: 0.96 MG/DL — SIGNIFICANT CHANGE UP (ref 0.5–1.3)
EGFR: 88 ML/MIN/1.73M2 — SIGNIFICANT CHANGE UP
ESTIMATED AVERAGE GLUCOSE: 111 MG/DL — SIGNIFICANT CHANGE UP (ref 68–114)
GLUCOSE SERPL-MCNC: 117 MG/DL — HIGH (ref 70–99)
HCT VFR BLD CALC: 44.1 % — SIGNIFICANT CHANGE UP (ref 39–50)
HGB BLD-MCNC: 14.9 G/DL — SIGNIFICANT CHANGE UP (ref 13–17)
INR BLD: 1.07 RATIO — SIGNIFICANT CHANGE UP (ref 0.88–1.16)
MCHC RBC-ENTMCNC: 31.4 PG — SIGNIFICANT CHANGE UP (ref 27–34)
MCHC RBC-ENTMCNC: 33.8 GM/DL — SIGNIFICANT CHANGE UP (ref 32–36)
MCV RBC AUTO: 92.8 FL — SIGNIFICANT CHANGE UP (ref 80–100)
NRBC # BLD: 0 /100 WBCS — SIGNIFICANT CHANGE UP (ref 0–0)
PLATELET # BLD AUTO: 219 K/UL — SIGNIFICANT CHANGE UP (ref 150–400)
POTASSIUM SERPL-MCNC: 4.8 MMOL/L — SIGNIFICANT CHANGE UP (ref 3.5–5.3)
POTASSIUM SERPL-SCNC: 4.8 MMOL/L — SIGNIFICANT CHANGE UP (ref 3.5–5.3)
PROT SERPL-MCNC: 7 G/DL — SIGNIFICANT CHANGE UP (ref 6–8.3)
PROTHROM AB SERPL-ACNC: 12.6 SEC — SIGNIFICANT CHANGE UP (ref 10.5–13.4)
RBC # BLD: 4.75 M/UL — SIGNIFICANT CHANGE UP (ref 4.2–5.8)
RBC # FLD: 13.3 % — SIGNIFICANT CHANGE UP (ref 10.3–14.5)
SARS-COV-2 N GENE NPH QL NAA+PROBE: NOT DETECTED
SODIUM SERPL-SCNC: 143 MMOL/L — SIGNIFICANT CHANGE UP (ref 135–145)
WBC # BLD: 6.26 K/UL — SIGNIFICANT CHANGE UP (ref 3.8–10.5)
WBC # FLD AUTO: 6.26 K/UL — SIGNIFICANT CHANGE UP (ref 3.8–10.5)

## 2023-03-14 PROCEDURE — 36415 COLL VENOUS BLD VENIPUNCTURE: CPT

## 2023-03-14 PROCEDURE — 85730 THROMBOPLASTIN TIME PARTIAL: CPT

## 2023-03-14 PROCEDURE — 86901 BLOOD TYPING SEROLOGIC RH(D): CPT

## 2023-03-14 PROCEDURE — 87640 STAPH A DNA AMP PROBE: CPT

## 2023-03-14 PROCEDURE — 93010 ELECTROCARDIOGRAM REPORT: CPT

## 2023-03-14 PROCEDURE — 85027 COMPLETE CBC AUTOMATED: CPT

## 2023-03-14 PROCEDURE — 80053 COMPREHEN METABOLIC PANEL: CPT

## 2023-03-14 PROCEDURE — 86850 RBC ANTIBODY SCREEN: CPT

## 2023-03-14 PROCEDURE — 93005 ELECTROCARDIOGRAM TRACING: CPT

## 2023-03-14 PROCEDURE — 86900 BLOOD TYPING SEROLOGIC ABO: CPT

## 2023-03-14 PROCEDURE — 85610 PROTHROMBIN TIME: CPT

## 2023-03-14 PROCEDURE — 83036 HEMOGLOBIN GLYCOSYLATED A1C: CPT

## 2023-03-14 PROCEDURE — 87641 MR-STAPH DNA AMP PROBE: CPT

## 2023-03-14 PROCEDURE — G0463: CPT

## 2023-03-14 RX ORDER — OMEGA-3 ACID ETHYL ESTERS 1 G
1 CAPSULE ORAL
Qty: 0 | Refills: 0 | DISCHARGE

## 2023-03-14 RX ORDER — LAMOTRIGINE 25 MG/1
1 TABLET, ORALLY DISINTEGRATING ORAL
Qty: 0 | Refills: 0 | DISCHARGE

## 2023-03-14 NOTE — H&P PST ADULT - NSICDXFAMILYHX_GEN_ALL_CORE_FT
FAMILY HISTORY:  Father  Still living? No  Family history of pacemaker, Age at diagnosis: Age Unknown    Mother  Still living? No  Family history of liver cancer, Age at diagnosis: Age Unknown    Sibling  Still living? Yes, Estimated age: 61-70  Family history of abdominal aortic aneurysm (AAA) repair, Age at diagnosis: Age Unknown  Family history of testicular cancer, Age at diagnosis: Age Unknown

## 2023-03-14 NOTE — H&P PST ADULT - PROBLEM SELECTOR PLAN 1
Right anterior total hip replacement is planned for 4/3/2023  Covid testing today and if negative to be repeated 3/31/2023   Diagnostic testing performed  Medical, cardiac and pulmonary clearances requested  Pre op instructions were reviewed  Joint replacement protocol reviewed  Best wishes offered

## 2023-03-14 NOTE — H&P PST ADULT - HISTORY OF PRESENT ILLNESS
68 yo male reports right hip pain with radiation to right groin and lower back and right leg since 5/2022.  His pain is intensified with physical activity rating 8/10 at worst.  He is scheduled for right anterior total hip replacement on 4/3/2023 @ [facility].

## 2023-03-14 NOTE — H&P PST ADULT - NSICDXPASTSURGICALHX_GEN_ALL_CORE_FT
PAST SURGICAL HISTORY:  H/O arthroscopy of right knee torn meniscus    H/O hernia repair     History of bilateral carpal tunnel release     History of tonsillectomy

## 2023-03-14 NOTE — H&P PST ADULT - NSICDXPROCEDURE_GEN_ALL_CORE_FT
PROCEDURES:  Total replacement of right hip joint by anterior approach 14-Mar-2023 09:29:29  Laura Macedo

## 2023-03-15 LAB
MRSA PCR RESULT.: SIGNIFICANT CHANGE UP
S AUREUS DNA NOSE QL NAA+PROBE: SIGNIFICANT CHANGE UP

## 2023-03-17 ENCOUNTER — APPOINTMENT (OUTPATIENT)
Dept: INTERNAL MEDICINE | Facility: CLINIC | Age: 68
End: 2023-03-17
Payer: MEDICARE

## 2023-03-17 ENCOUNTER — APPOINTMENT (OUTPATIENT)
Dept: CARDIOLOGY | Facility: CLINIC | Age: 68
End: 2023-03-17
Payer: MEDICARE

## 2023-03-17 VITALS
DIASTOLIC BLOOD PRESSURE: 80 MMHG | SYSTOLIC BLOOD PRESSURE: 114 MMHG | TEMPERATURE: 98.7 F | OXYGEN SATURATION: 98 % | HEART RATE: 54 BPM | BODY MASS INDEX: 24.44 KG/M2 | HEIGHT: 69 IN | WEIGHT: 165 LBS

## 2023-03-17 VITALS
BODY MASS INDEX: 24.44 KG/M2 | SYSTOLIC BLOOD PRESSURE: 114 MMHG | OXYGEN SATURATION: 98 % | HEART RATE: 54 BPM | DIASTOLIC BLOOD PRESSURE: 66 MMHG | HEIGHT: 69 IN | WEIGHT: 165 LBS

## 2023-03-17 DIAGNOSIS — M16.11 UNILATERAL PRIMARY OSTEOARTHRITIS, RIGHT HIP: ICD-10-CM

## 2023-03-17 DIAGNOSIS — Z23 ENCOUNTER FOR IMMUNIZATION: ICD-10-CM

## 2023-03-17 DIAGNOSIS — G47.33 OBSTRUCTIVE SLEEP APNEA (ADULT) (PEDIATRIC): ICD-10-CM

## 2023-03-17 PROBLEM — E78.00 PURE HYPERCHOLESTEROLEMIA, UNSPECIFIED: Chronic | Status: ACTIVE | Noted: 2023-03-14

## 2023-03-17 PROBLEM — G47.30 SLEEP APNEA, UNSPECIFIED: Chronic | Status: ACTIVE | Noted: 2023-03-14

## 2023-03-17 PROCEDURE — 93000 ELECTROCARDIOGRAM COMPLETE: CPT | Mod: NC

## 2023-03-17 PROCEDURE — 99214 OFFICE O/P EST MOD 30 MIN: CPT | Mod: 25

## 2023-03-17 PROCEDURE — 99214 OFFICE O/P EST MOD 30 MIN: CPT

## 2023-03-17 RX ORDER — TURMERIC ROOT EXTRACT 500 MG
TABLET ORAL
Refills: 0 | Status: ACTIVE | COMMUNITY

## 2023-03-17 RX ORDER — MAGNESIUM OXIDE/MAG AA CHELATE 300 MG
CAPSULE ORAL
Refills: 0 | Status: ACTIVE | COMMUNITY

## 2023-03-17 RX ORDER — SENNOSIDES 8.6 MG
TABLET ORAL
Refills: 0 | Status: ACTIVE | COMMUNITY

## 2023-03-17 RX ORDER — PSYLLIUM HUSK 0.4 G
CAPSULE ORAL
Refills: 0 | Status: ACTIVE | COMMUNITY

## 2023-03-17 NOTE — HISTORY OF PRESENT ILLNESS
[No Adverse Anesthesia Reaction] : no adverse anesthesia reaction in self or family member [(Patient denies any chest pain, claudication, dyspnea on exertion, orthopnea, palpitations or syncope)] : Patient denies any chest pain, claudication, dyspnea on exertion, orthopnea, palpitations or syncope [FreeTextEntry1] : Right anterior total hip replacement [FreeTextEntry2] : 4/3/2023 [FreeTextEntry3] : Dr.Ayal Loredo [FreeTextEntry4] : Mr. GA HARO is a 67 year M who comes in for a preoperative evaluation.\par Pt with hx of severe right hip pain, radiating to the groin and back and affecting his activities of daily living. He had prior surgery scheduled but canceled because needed new sleep study.\par Pt established with Dr.Emmanuel Bledsoe, pulmonary medicine, and had sleep study last month which showed sleep apnea and now on CPAP treatment. Pt had cardiac clearance today and will speak to Pulm PA Carla Butler on Tuesday on 3/21/23 for Pulm clearance. \par Patient denies any cp, sob,abdominal pain, nausea, vomiting, palpitations, fever, chills, constipation, diarrhea.\par \par Anesthesia History: Mr. GA HARO has had no adverse effects to anesthesia in the past. \par \par Functional Capacity-Walking 1-2 blocks or climbing stairs symptoms: Mr. GA HARO denies any symptoms of chest pain, SIERRA, SOB or palpitations.\par

## 2023-03-17 NOTE — RESULTS/DATA
[] : results reviewed [de-identified] : stable, wnl [de-identified] : stable, wnl [de-identified] : stable, wnl [de-identified] : sinus bradycardia at 52 bpm, rsR' in V1.  [de-identified] : EST 9/2021: no ischemia, Echo 9/2021: normal EF, borderline aortic root dilation, normal ascending aorta diameter.

## 2023-03-17 NOTE — ASSESSMENT
[FreeTextEntry1] : A/P:\par \par *preop clearance\par -intermediate risk, prior extensitve cardiac testing unremarkable.\par ECG today unremarkable, maintains high level of activity w/o symptoms.\par -low risk for cardiac events periop\par -Cleared for surgery.

## 2023-03-17 NOTE — HISTORY OF PRESENT ILLNESS
[FreeTextEntry1] : 65 Y/o gentleman PMH: HLD, Depression followed with Psychiatry, B/L carpal tunnel release \par JAVIER on CPAP.\par \par referred for evaluation of Sinus Bradycardia Sep '21\par last visit cleared for ACL surgery but pt tells me this was cancelled\par \par here for hip surgery April 3rd '23.  Overnight stay postop.\par \par since last visit was cleared for sleep apnea\par now getting CPAP treatment\par Tues will meet w/ pulmonologist for clearance.\par Since last visit, no chest pain, dyspnea, lightheadness/dizziness, syncope.\par Maintains relatively high level of activity despite hip pain\par high intensity interval training clases, burpees mountain climbers, \par    gets HR elevated. Also does hot yoga\par \par ECG: sinus dima 52 rsR' in V1.\par EST Sep '21 w/ no ischemia excellent functional capacity 14 min (15 METs).\par Echo Sep '21 w/ normal EF. borderline aortic root dilation (3.7 cm) & normal ascending aorta diameter. \par Holter Sep '22: Ave heart rate 59. no significant arrhythmias.\par \par

## 2023-03-17 NOTE — ASSESSMENT
[Patient Optimized for Surgery] : Patient optimized for surgery [As per surgery] : as per surgery [FreeTextEntry4] : Patient is moderate risk for intermediate risk procedure.\par \par EKG and labs reviewed.  Cardiology clearance completed per cardiology.  Patient will speak with pulmonary medicine on Tuesday 3/21/23 and obtain pulmonary clearance with Dr. Adorno During.  Will likely need to bring in his CPAP to the hospital.\par \par Patient advised to hold aspirin, all NSAIDs including Motrin, naproxen, Aleve, ibuprofen, Advil and fish oil 7 days prior to surgery.\par Adequate oxygen monitoring. DVT prophylaxis.\par Continue current medications as directed.\par Covid 19 nasopharyngeal swab per Surgery.\par

## 2023-03-31 ENCOUNTER — TRANSCRIPTION ENCOUNTER (OUTPATIENT)
Age: 68
End: 2023-03-31

## 2023-03-31 LAB — SARS-COV-2 N GENE NPH QL NAA+PROBE: NOT DETECTED

## 2023-04-03 ENCOUNTER — TRANSCRIPTION ENCOUNTER (OUTPATIENT)
Age: 68
End: 2023-04-03

## 2023-04-03 ENCOUNTER — APPOINTMENT (OUTPATIENT)
Dept: ORTHOPEDIC SURGERY | Facility: HOSPITAL | Age: 68
End: 2023-04-03

## 2023-04-03 ENCOUNTER — OUTPATIENT (OUTPATIENT)
Dept: OUTPATIENT SERVICES | Facility: HOSPITAL | Age: 68
LOS: 1 days | End: 2023-04-03
Payer: MEDICARE

## 2023-04-03 VITALS
TEMPERATURE: 98 F | DIASTOLIC BLOOD PRESSURE: 77 MMHG | HEIGHT: 69 IN | WEIGHT: 165.35 LBS | SYSTOLIC BLOOD PRESSURE: 126 MMHG | RESPIRATION RATE: 10 BRPM | HEART RATE: 54 BPM | OXYGEN SATURATION: 100 %

## 2023-04-03 DIAGNOSIS — Z98.890 OTHER SPECIFIED POSTPROCEDURAL STATES: Chronic | ICD-10-CM

## 2023-04-03 DIAGNOSIS — Z90.89 ACQUIRED ABSENCE OF OTHER ORGANS: Chronic | ICD-10-CM

## 2023-04-03 DIAGNOSIS — M16.11 UNILATERAL PRIMARY OSTEOARTHRITIS, RIGHT HIP: ICD-10-CM

## 2023-04-03 LAB — ABO RH CONFIRMATION: SIGNIFICANT CHANGE UP

## 2023-04-03 PROCEDURE — 27130 TOTAL HIP ARTHROPLASTY: CPT | Mod: AS,RT

## 2023-04-03 PROCEDURE — 27130 TOTAL HIP ARTHROPLASTY: CPT | Mod: RT

## 2023-04-03 PROCEDURE — 99205 OFFICE O/P NEW HI 60 MIN: CPT

## 2023-04-03 RX ORDER — MAGNESIUM HYDROXIDE 400 MG/1
30 TABLET, CHEWABLE ORAL DAILY
Refills: 0 | Status: DISCONTINUED | OUTPATIENT
Start: 2023-04-03 | End: 2023-04-17

## 2023-04-03 RX ORDER — OXYCODONE HYDROCHLORIDE 5 MG/1
10 TABLET ORAL
Refills: 0 | Status: DISCONTINUED | OUTPATIENT
Start: 2023-04-03 | End: 2023-04-03

## 2023-04-03 RX ORDER — DEXAMETHASONE 0.5 MG/5ML
8 ELIXIR ORAL ONCE
Refills: 0 | Status: COMPLETED | OUTPATIENT
Start: 2023-04-04 | End: 2023-04-04

## 2023-04-03 RX ORDER — SODIUM CHLORIDE 9 MG/ML
1000 INJECTION, SOLUTION INTRAVENOUS
Refills: 0 | Status: DISCONTINUED | OUTPATIENT
Start: 2023-04-03 | End: 2023-04-03

## 2023-04-03 RX ORDER — ONDANSETRON 8 MG/1
4 TABLET, FILM COATED ORAL EVERY 6 HOURS
Refills: 0 | Status: DISCONTINUED | OUTPATIENT
Start: 2023-04-03 | End: 2023-04-17

## 2023-04-03 RX ORDER — HYDROMORPHONE HYDROCHLORIDE 2 MG/ML
0.5 INJECTION INTRAMUSCULAR; INTRAVENOUS; SUBCUTANEOUS
Refills: 0 | Status: DISCONTINUED | OUTPATIENT
Start: 2023-04-03 | End: 2023-04-03

## 2023-04-03 RX ORDER — ONDANSETRON 8 MG/1
4 TABLET, FILM COATED ORAL ONCE
Refills: 0 | Status: DISCONTINUED | OUTPATIENT
Start: 2023-04-03 | End: 2023-04-03

## 2023-04-03 RX ORDER — CELECOXIB 200 MG/1
200 CAPSULE ORAL EVERY 12 HOURS
Refills: 0 | Status: DISCONTINUED | OUTPATIENT
Start: 2023-04-03 | End: 2023-04-17

## 2023-04-03 RX ORDER — ASPIRIN/CALCIUM CARB/MAGNESIUM 324 MG
81 TABLET ORAL EVERY 12 HOURS
Refills: 0 | Status: DISCONTINUED | OUTPATIENT
Start: 2023-04-04 | End: 2023-04-17

## 2023-04-03 RX ORDER — POLYETHYLENE GLYCOL 3350 17 G/17G
17 POWDER, FOR SOLUTION ORAL AT BEDTIME
Refills: 0 | Status: DISCONTINUED | OUTPATIENT
Start: 2023-04-03 | End: 2023-04-17

## 2023-04-03 RX ORDER — SODIUM CHLORIDE 9 MG/ML
500 INJECTION INTRAMUSCULAR; INTRAVENOUS; SUBCUTANEOUS ONCE
Refills: 0 | Status: COMPLETED | OUTPATIENT
Start: 2023-04-03 | End: 2023-04-03

## 2023-04-03 RX ORDER — SODIUM CHLORIDE 0.65 %
1 AEROSOL, SPRAY (ML) NASAL THREE TIMES A DAY
Refills: 0 | Status: DISCONTINUED | OUTPATIENT
Start: 2023-04-03 | End: 2023-04-17

## 2023-04-03 RX ORDER — SODIUM CHLORIDE 9 MG/ML
1000 INJECTION, SOLUTION INTRAVENOUS
Refills: 0 | Status: DISCONTINUED | OUTPATIENT
Start: 2023-04-03 | End: 2023-04-17

## 2023-04-03 RX ORDER — SENNA PLUS 8.6 MG/1
2 TABLET ORAL AT BEDTIME
Refills: 0 | Status: DISCONTINUED | OUTPATIENT
Start: 2023-04-03 | End: 2023-04-17

## 2023-04-03 RX ORDER — ACETAMINOPHEN 500 MG
1000 TABLET ORAL EVERY 6 HOURS
Refills: 0 | Status: COMPLETED | OUTPATIENT
Start: 2023-04-03 | End: 2023-04-03

## 2023-04-03 RX ORDER — ATORVASTATIN CALCIUM 80 MG/1
20 TABLET, FILM COATED ORAL AT BEDTIME
Refills: 0 | Status: DISCONTINUED | OUTPATIENT
Start: 2023-04-03 | End: 2023-04-17

## 2023-04-03 RX ORDER — OXYCODONE HYDROCHLORIDE 5 MG/1
5 TABLET ORAL
Refills: 0 | Status: DISCONTINUED | OUTPATIENT
Start: 2023-04-03 | End: 2023-04-03

## 2023-04-03 RX ORDER — PANTOPRAZOLE SODIUM 20 MG/1
40 TABLET, DELAYED RELEASE ORAL
Refills: 0 | Status: DISCONTINUED | OUTPATIENT
Start: 2023-04-03 | End: 2023-04-17

## 2023-04-03 RX ORDER — ACETAMINOPHEN 500 MG
1000 TABLET ORAL EVERY 8 HOURS
Refills: 0 | Status: DISCONTINUED | OUTPATIENT
Start: 2023-04-04 | End: 2023-04-17

## 2023-04-03 RX ORDER — CEFAZOLIN SODIUM 1 G
2000 VIAL (EA) INJECTION EVERY 8 HOURS
Refills: 0 | Status: COMPLETED | OUTPATIENT
Start: 2023-04-03 | End: 2023-04-04

## 2023-04-03 RX ORDER — OXYCODONE HYDROCHLORIDE 5 MG/1
5 TABLET ORAL ONCE
Refills: 0 | Status: DISCONTINUED | OUTPATIENT
Start: 2023-04-03 | End: 2023-04-03

## 2023-04-03 RX ADMIN — CELECOXIB 200 MILLIGRAM(S): 200 CAPSULE ORAL at 21:11

## 2023-04-03 RX ADMIN — SODIUM CHLORIDE 75 MILLILITER(S): 9 INJECTION, SOLUTION INTRAVENOUS at 15:52

## 2023-04-03 RX ADMIN — SODIUM CHLORIDE 125 MILLILITER(S): 9 INJECTION, SOLUTION INTRAVENOUS at 17:33

## 2023-04-03 RX ADMIN — Medication 100 MILLIGRAM(S): at 19:00

## 2023-04-03 RX ADMIN — ATORVASTATIN CALCIUM 20 MILLIGRAM(S): 80 TABLET, FILM COATED ORAL at 21:10

## 2023-04-03 RX ADMIN — Medication 400 MILLIGRAM(S): at 17:32

## 2023-04-03 RX ADMIN — SODIUM CHLORIDE 500 MILLILITER(S): 9 INJECTION INTRAMUSCULAR; INTRAVENOUS; SUBCUTANEOUS at 17:33

## 2023-04-03 RX ADMIN — Medication 400 MILLIGRAM(S): at 23:40

## 2023-04-03 RX ADMIN — Medication 1000 MILLIGRAM(S): at 17:33

## 2023-04-03 RX ADMIN — SODIUM CHLORIDE 500 MILLILITER(S): 9 INJECTION INTRAMUSCULAR; INTRAVENOUS; SUBCUTANEOUS at 15:53

## 2023-04-03 NOTE — PHYSICAL THERAPY INITIAL EVALUATION ADULT - GAIT TRAINING, PT EVAL
Patient will ambulate 150 feet independently with a rolling walker within 1-2 days for safe community ambulation. Patient will ascend/descend 10 stairs independently with +HR and straight cane within 1-2 days for safe household stair negotiation.

## 2023-04-03 NOTE — DISCHARGE NOTE PROVIDER - HOSPITAL COURSE
This patient is a 67 y.o. male with severe osteoarthritis of the right hip.  After admission on 4/3/23 and receiving pre-operative parenteral prophylactic antibiotics, the patient underwent an uncomplicated Right Anterior Total Hip Replacement by Dr. Loredo.    A medical consultation from the Hospitalist service was obtained for post-operative medical co-management.   Typical Physical & occupational therapy modalities post Right Anterior Total Hip Replacement were performed including ambulation training, range of motion, ADL's, and transfers.  Aspirin 81mg q 12 h was given for DVT prophylaxis.  The patient had a clean appearing surgical incision with no sign of surgical site infections and had a stable neuro / vascular exam of the operated extremity.     Discharge and Orthopedic Care instructions were delineated in the Discharge Plan and reviewed with the patient.   All medications were delineated in the medication reconciliation tool and key points were reviewed with the patient.   The patient was deemed stable from an Orthopedic & medical standpoint for discharge today.  He will follow up with Dr. Loredo for further orthopedic care upon completion of Home care PT.

## 2023-04-03 NOTE — DISCHARGE NOTE PROVIDER - CARE PROVIDER_API CALL
Aubrey Loredo)  Orthopedics  [address], [address]  [place] [address]  Phone: [phone]  Fax: [phone]  Established Patient  Scheduled Appointment: 04/20/2023 11:30 AM

## 2023-04-03 NOTE — PHYSICAL THERAPY INITIAL EVALUATION ADULT - RANGE OF MOTION EXAMINATION, REHAB EVAL
right hip not assessed due to surgery/bilateral upper extremity ROM was WFL (within functional limits)/bilateral lower extremity ROM was WFL (within functional limits)

## 2023-04-03 NOTE — PHYSICAL THERAPY INITIAL EVALUATION ADULT - CRITERIA FOR SKILLED THERAPEUTIC INTERVENTIONS
Pharmacy sent a refill request for Sucralfate. Pt was last seen 4/2021and notes indicate to tae medication for 10 days.    Sucralfate will not be refill.   
impairments found/therapy frequency/predicted duration of therapy intervention/anticipated equipment needs at discharge/anticipated discharge recommendation

## 2023-04-03 NOTE — DISCHARGE NOTE PROVIDER - NSDCCPTREATMENT_GEN_ALL_CORE_FT
PRINCIPAL PROCEDURE  Procedure: Total replacement of right hip joint by anterior approach  Findings and Treatment: GOMEZ

## 2023-04-03 NOTE — PHYSICAL THERAPY INITIAL EVALUATION ADULT - ADDITIONAL COMMENTS
Pt lives with wife in a private home, there are 3 stairs to enter and 10 stairs +HR to the primary bedroom/bathroom. Pt has a walk in shower. PTA pt was independent with ADLs and ambulation.

## 2023-04-03 NOTE — CONSULT NOTE ADULT - ASSESSMENT
68 y/o mal with hx of HLD, OA, he has right hip pain with radiation to right groin and lower back and right leg since 5/2022, pain is getting worse, he came in here for elective right anterior total hip replacement on 4/3/2023 s/p procedure.     Plan:     OA of the Right hip s/p THR post op day 0:     - post op no complications  - VS stable  - abx per ortho   - c/w IVF x 24 hrs then reassess per ortho  - opiate induced constipation regimen   - encouraging incentive spirometry   -c/w local wound care per ortho   -DVT prophylaxis and Pain meds as per Ortho team   -PT/OT and weight bearing per ortho     HLD: Atorvastatin 20mg daily.  66 y/o mal with hx of HLD, OA, he has right hip pain with radiation to right groin and lower back and right leg since 5/2022, pain is getting worse, he came in here for elective right anterior total hip replacement on 4/3/2023 s/p procedure.     Plan:     OA of the Right hip s/p THR post op day 0:     - post op no complications  - VS stable  - abx per ortho   - c/w IVF x 24 hrs then reassess per ortho  - opiate induced constipation regimen   - encouraging incentive spirometry   -c/w local wound care per ortho   -DVT prophylaxis and Pain meds as per Ortho team   -PT/OT and weight bearing per ortho     HLD: Atorvastatin 20mg daily.     Sleep apnea: CPAP patient own equipment

## 2023-04-03 NOTE — DISCHARGE NOTE PROVIDER - NSDCFUSCHEDAPPT_GEN_ALL_CORE_FT
Aubrey Loredo  [facility] PreAdmits.  Scheduled Appointment: 04/09/2023    Palma Smiley  [facility]  ORTHOSURG [address]  Scheduled Appointment: 04/20/2023    Aubrey Loredo  [facility]  ORTHOSURG [address]  Scheduled Appointment: 06/02/2023

## 2023-04-03 NOTE — PHYSICAL THERAPY INITIAL EVALUATION ADULT - PERTINENT HX OF CURRENT PROBLEM, REHAB EVAL
Pt is a 66 y/o male with right hip primary OA. Pt is s/p right total hip replacement anterior approach on 4/3/23.

## 2023-04-03 NOTE — DISCHARGE NOTE PROVIDER - NSDCMRMEDTOKEN_GEN_ALL_CORE_FT
atorvastatin 20 mg oral tablet: 1 tab(s) orally once a day   acetaminophen 500 mg oral tablet: 2 tab(s) orally every 8 hours  Aspirin Enteric Coated 81 mg oral delayed release tablet: 1 tab(s) orally every 12 hours  atorvastatin 20 mg oral tablet: 1 tab(s) orally once a day  CeleBREX 200 mg oral capsule: 1 cap(s) orally every 12 hours  omeprazole 20 mg oral delayed release capsule: 1 cap(s) orally once a day  oxyCODONE 5 mg oral tablet: 1 tab(s) orally every 4 hours as needed for -for moderate pain MDD: 6  polyethylene glycol 3350 oral powder for reconstitution: 17 gram(s) orally once a day (at bedtime)  senna leaf extract oral tablet: 2 tab(s) orally once a day (at bedtime)

## 2023-04-03 NOTE — CONSULT NOTE ADULT - SUBJECTIVE AND OBJECTIVE BOX
66 y/o mal with hx of HLD, OA, he has right hip pain with radiation to right groin and lower back and right leg since 5/2022, pain is getting worse, he came in here for elective right anterior total hip replacement on 4/3/2023 s/p procedure, Patient tolerated procedure well, patient's pain is well controlled, he has no chest pain, sob, dizziness, fever, chills, nausea, vomiting.     REVIEW OF SYSTEMS:    CONSTITUTIONAL: No fever, some fatigue  RESPIRATORY: No cough, No shortness of breath  CARDIOVASCULAR: No chest pain, palpitations  GASTROINTESTINAL: No abdominal, No nausea, vomiting  NEUROLOGICAL: No headaches,  loss of strength.  MISCELLANEOUS: Right hip pain is well controlled       Allergies:  	No Known Allergies:      Past Medical, Past Surgical, and Family History:  PAST MEDICAL HISTORY:  Hypercholesteremia     Osteoarthritis of right hip     Sleep apnea.     PAST SURGICAL HISTORY:  H/O arthroscopy of right knee torn meniscus    H/O hernia repair     History of bilateral carpal tunnel release     History of tonsillectomy.     FAMILY HISTORY:  Father  Still living? No  Family history of pacemaker, Age at diagnosis: Age Unknown    Mother  Still living? No  Family history of liver cancer, Age at diagnosis: Age Unknown    Sibling  Still living? Yes, Estimated age: 61-70  Family history of abdominal aortic aneurysm (AAA) repair, Age at diagnosis: Age Unknown  Family history of testicular cancer, Age at diagnosis: Age Unknown.     Anesthesia History:  · Previous Reaction to Anesthesia	none  · Family History of Anesthesia Reaction/Malignant Hyperthermia	No  · Latex Allergy	No    Social History: Not a smoker, drinker or using any drugs     Vital Signs Last 24 Hrs  T(C): 36.3 (03 Apr 2023 12:49), Max: 36.4 (03 Apr 2023 08:14)  T(F): 97.4 (03 Apr 2023 12:49), Max: 97.6 (03 Apr 2023 08:14)  HR: 53 (03 Apr 2023 14:35) (46 - 67)  BP: 102/53 (03 Apr 2023 14:35) (102/53 - 126/77)  BP(mean): --  RR: 15 (03 Apr 2023 14:35) (10 - 18)  SpO2: 100% (03 Apr 2023 14:35) (97% - 100%)    Parameters below as of 03 Apr 2023 14:05  Patient On (Oxygen Delivery Method): room air        PHYSICAL EXAM:    GENERAL: Middle age male looking comfortable    HEENT: PERRL, +EOMI  NECK: soft, Supple, No JVD   CHEST/LUNG: Clear to auscultate bilaterally; No wheezing  HEART: S1S2+, Regular rate and rhythm; No murmurs  ABDOMEN: Soft, Nontender, Nondistended; Bowel sounds present  EXTREMITIES:  1+ Peripheral Pulses, No edema, Right hip Joint area cover with dressing, no bleeding or soaking   SKIN: No rashes or lesions  NEURO: AAOX3  PSYCH: normal mood

## 2023-04-03 NOTE — BRIEF OPERATIVE NOTE - NSICDXBRIEFPROCEDURE_GEN_ALL_CORE_FT
PROCEDURES:  Total replacement of right hip joint by anterior approach 03-Apr-2023 12:29:42  Gabriel Correa

## 2023-04-03 NOTE — DISCHARGE NOTE PROVIDER - NSDCCPCAREPLAN_GEN_ALL_CORE_FT
PRINCIPAL DISCHARGE DIAGNOSIS  Diagnosis: Primary osteoarthritis of right hip  Assessment and Plan of Treatment: Physical Therapy/Occupational Therapy for: Ambulation, transfers, stairs, & ADLs, isometrics.   Full weight bearing on both legs; Walker/cane use as instructed by Physical therapy/Occupational therapy. Anterior Total Hip Replacement precautions for  6 weeks: No straight leg raise; No external rotation of hip when extended-standing or lying flat; No hyperextension of hip when standing (kickback).  Ice packs to hip for 30 min. every 3 hours and after physical therapy.   Keep incision clean and dry. May shower 5 days after surgery if no drainage from incision.  You have a Prineo dressing over your incision (tape and glue).  Prineo tape on/near after Post Op Day # 14 in Surgeon's office.  • Do not take a tub bath yet.   • Do not resume driving until you have your surgeon’s permission.   You may shower. Dressing is water resistant, not waterproof. Do not aim shower stream at surgical site.  Pat dry after showering.  Mepilex dressing will be removed 1 week post-op.

## 2023-04-03 NOTE — DISCHARGE NOTE PROVIDER - PROVIDER TOKENS
PROVIDER:[TOKEN:[[id number]],SCHEDULEDAPPT:[04/20/2023],SCHEDULEDAPPTTIME:[11:30 AM],ESTABLISHEDPATIENT:[T]]

## 2023-04-04 ENCOUNTER — TRANSCRIPTION ENCOUNTER (OUTPATIENT)
Age: 68
End: 2023-04-04

## 2023-04-04 VITALS — HEART RATE: 64 BPM | TEMPERATURE: 98 F | DIASTOLIC BLOOD PRESSURE: 72 MMHG | SYSTOLIC BLOOD PRESSURE: 148 MMHG

## 2023-04-04 LAB
ANION GAP SERPL CALC-SCNC: 12 MMOL/L — SIGNIFICANT CHANGE UP (ref 5–17)
BUN SERPL-MCNC: 12 MG/DL — SIGNIFICANT CHANGE UP (ref 7–23)
CALCIUM SERPL-MCNC: 9.1 MG/DL — SIGNIFICANT CHANGE UP (ref 8.4–10.5)
CHLORIDE SERPL-SCNC: 105 MMOL/L — SIGNIFICANT CHANGE UP (ref 96–108)
CO2 SERPL-SCNC: 26 MMOL/L — SIGNIFICANT CHANGE UP (ref 22–31)
CREAT SERPL-MCNC: 0.91 MG/DL — SIGNIFICANT CHANGE UP (ref 0.5–1.3)
EGFR: 94 ML/MIN/1.73M2 — SIGNIFICANT CHANGE UP
GLUCOSE SERPL-MCNC: 131 MG/DL — HIGH (ref 70–99)
HCT VFR BLD CALC: 43.2 % — SIGNIFICANT CHANGE UP (ref 39–50)
HGB BLD-MCNC: 14.4 G/DL — SIGNIFICANT CHANGE UP (ref 13–17)
MCHC RBC-ENTMCNC: 30.8 PG — SIGNIFICANT CHANGE UP (ref 27–34)
MCHC RBC-ENTMCNC: 33.3 GM/DL — SIGNIFICANT CHANGE UP (ref 32–36)
MCV RBC AUTO: 92.3 FL — SIGNIFICANT CHANGE UP (ref 80–100)
NRBC # BLD: 0 /100 WBCS — SIGNIFICANT CHANGE UP (ref 0–0)
PLATELET # BLD AUTO: 192 K/UL — SIGNIFICANT CHANGE UP (ref 150–400)
POTASSIUM SERPL-MCNC: 4.4 MMOL/L — SIGNIFICANT CHANGE UP (ref 3.5–5.3)
POTASSIUM SERPL-SCNC: 4.4 MMOL/L — SIGNIFICANT CHANGE UP (ref 3.5–5.3)
RBC # BLD: 4.68 M/UL — SIGNIFICANT CHANGE UP (ref 4.2–5.8)
RBC # FLD: 13.1 % — SIGNIFICANT CHANGE UP (ref 10.3–14.5)
SODIUM SERPL-SCNC: 143 MMOL/L — SIGNIFICANT CHANGE UP (ref 135–145)
WBC # BLD: 21.18 K/UL — HIGH (ref 3.8–10.5)
WBC # FLD AUTO: 21.18 K/UL — HIGH (ref 3.8–10.5)

## 2023-04-04 PROCEDURE — 85027 COMPLETE CBC AUTOMATED: CPT

## 2023-04-04 PROCEDURE — 99203 OFFICE O/P NEW LOW 30 MIN: CPT

## 2023-04-04 PROCEDURE — C1776: CPT

## 2023-04-04 PROCEDURE — 97116 GAIT TRAINING THERAPY: CPT

## 2023-04-04 PROCEDURE — 97110 THERAPEUTIC EXERCISES: CPT

## 2023-04-04 PROCEDURE — 94664 DEMO&/EVAL PT USE INHALER: CPT

## 2023-04-04 PROCEDURE — C1713: CPT

## 2023-04-04 PROCEDURE — 36415 COLL VENOUS BLD VENIPUNCTURE: CPT

## 2023-04-04 PROCEDURE — C1889: CPT

## 2023-04-04 PROCEDURE — 97161 PT EVAL LOW COMPLEX 20 MIN: CPT

## 2023-04-04 PROCEDURE — 27130 TOTAL HIP ARTHROPLASTY: CPT | Mod: RT

## 2023-04-04 PROCEDURE — 76000 FLUOROSCOPY <1 HR PHYS/QHP: CPT

## 2023-04-04 PROCEDURE — 94660 CPAP INITIATION&MGMT: CPT

## 2023-04-04 PROCEDURE — 97530 THERAPEUTIC ACTIVITIES: CPT

## 2023-04-04 PROCEDURE — 97165 OT EVAL LOW COMPLEX 30 MIN: CPT

## 2023-04-04 PROCEDURE — 80048 BASIC METABOLIC PNL TOTAL CA: CPT

## 2023-04-04 RX ORDER — LANOLIN ALCOHOL/MO/W.PET/CERES
5 CREAM (GRAM) TOPICAL ONCE
Refills: 0 | Status: COMPLETED | OUTPATIENT
Start: 2023-04-04 | End: 2023-04-04

## 2023-04-04 RX ORDER — POLYETHYLENE GLYCOL 3350 17 G/17G
17 POWDER, FOR SOLUTION ORAL
Qty: 0 | Refills: 0 | DISCHARGE
Start: 2023-04-04

## 2023-04-04 RX ORDER — ACETAMINOPHEN 500 MG
2 TABLET ORAL
Qty: 0 | Refills: 0 | DISCHARGE
Start: 2023-04-04

## 2023-04-04 RX ORDER — ASPIRIN/CALCIUM CARB/MAGNESIUM 324 MG
1 TABLET ORAL
Qty: 56 | Refills: 0
Start: 2023-04-04 | End: 2023-05-01

## 2023-04-04 RX ORDER — SENNA PLUS 8.6 MG/1
2 TABLET ORAL
Qty: 0 | Refills: 0 | DISCHARGE
Start: 2023-04-04

## 2023-04-04 RX ORDER — CELECOXIB 200 MG/1
1 CAPSULE ORAL
Qty: 60 | Refills: 0
Start: 2023-04-04

## 2023-04-04 RX ORDER — OMEPRAZOLE 10 MG/1
1 CAPSULE, DELAYED RELEASE ORAL
Qty: 30 | Refills: 1
Start: 2023-04-04

## 2023-04-04 RX ORDER — OXYCODONE HYDROCHLORIDE 5 MG/1
1 TABLET ORAL
Qty: 42 | Refills: 0
Start: 2023-04-04 | End: 2023-04-10

## 2023-04-04 RX ADMIN — Medication 1000 MILLIGRAM(S): at 06:49

## 2023-04-04 RX ADMIN — Medication 1000 MILLIGRAM(S): at 00:01

## 2023-04-04 RX ADMIN — Medication 1000 MILLIGRAM(S): at 13:55

## 2023-04-04 RX ADMIN — PANTOPRAZOLE SODIUM 40 MILLIGRAM(S): 20 TABLET, DELAYED RELEASE ORAL at 05:34

## 2023-04-04 RX ADMIN — Medication 1000 MILLIGRAM(S): at 05:33

## 2023-04-04 RX ADMIN — CELECOXIB 200 MILLIGRAM(S): 200 CAPSULE ORAL at 11:36

## 2023-04-04 RX ADMIN — Medication 1 SPRAY(S): at 00:09

## 2023-04-04 RX ADMIN — SODIUM CHLORIDE 125 MILLILITER(S): 9 INJECTION, SOLUTION INTRAVENOUS at 05:34

## 2023-04-04 RX ADMIN — Medication 100 MILLIGRAM(S): at 02:32

## 2023-04-04 RX ADMIN — Medication 101.6 MILLIGRAM(S): at 05:33

## 2023-04-04 RX ADMIN — Medication 5 MILLIGRAM(S): at 00:15

## 2023-04-04 RX ADMIN — Medication 81 MILLIGRAM(S): at 05:34

## 2023-04-04 NOTE — PROGRESS NOTE ADULT - ASSESSMENT
68 y/o mal with hx of HLD, OA, he has right hip pain with radiation to right groin and lower back and right leg since 5/2022, pain is getting worse, he came in here for elective right anterior total hip replacement on 4/3/2023 s/p procedure.     Plan:     OA of the Right hip s/p THR post op day 01:     - post op no complications  - VS stable  - abx per ortho   - opiate induced constipation regimen   - encouraging incentive spirometry   -c/w local wound care per ortho   -DVT prophylaxis and Pain meds as per Ortho team   -PT/OT and weight bearing per ortho     HLD: Atorvastatin 20mg daily.     Sleep apnea: CPAP patient own equipment     Leucocytosis: Likely post op, no focus of infection, no fever, no chills.    Patient is stable from the medicine point of view for discharge pending PT and Ortho eval.

## 2023-04-04 NOTE — CARE COORDINATION ASSESSMENT. - NSCAREPROVIDERS_GEN_ALL_CORE_FT
CARE PROVIDERS:  Administration: Rosalind Holloway  Administration: Alma Cox  Administration: Tray Friend  Admitting: Aubrey Loredo  Attending: Aubrey Loredo  Case Management: Adriane De La Garza  Clinical Doc. Improvement: Renita Rae  Consultant: Jumana Garrett  Nurse: Mady Nova  Nurse: Adamaris Partida  Nurse: Farhad Guallpa  Occupational Therapy: Erin Boucher  Override: Faustina Luke  Override: Joanna Pascual  Override: Mady Nova  Physical Therapy: Sonal Bender  Primary Team: Cortney Overton  Primary Team: Lisseth Du  Primary Team: Kamla Beltran  Primary Team: Farhad Castillo  Primary Team: Candelaria Vela  Primary Team: Gabriel Correa  Team: ABDON  Hospitalists, Team  UR// Supp. Assoc.: Alana Church

## 2023-04-04 NOTE — DISCHARGE NOTE NURSING/CASE MANAGEMENT/SOCIAL WORK - PATIENT PORTAL LINK FT
You can access the FollowMyHealth Patient Portal offered by [facility] by registering at the following website: [email or web address]. By joining [email or web address]’s FollowMyHealth portal, you will also be able to view your health information using other applications (apps) compatible with our system.

## 2023-04-04 NOTE — PROGRESS NOTE ADULT - SUBJECTIVE AND OBJECTIVE BOX
GA HARO    [id number]    67y      Male    Patient is a 67y old  Male who presents with a chief complaint of Right Anterior Total Hip Replacement  (03 Apr 2023 12:31)      INTERVAL HPI/OVERNIGHT EVENTS:    Patient is doing ok, pain is well managed with pain medications, working well with PT, has no fever, chills, chest pain, SOB, nausea, vomiting, constipation.     REVIEW OF SYSTEMS:    CONSTITUTIONAL: No fever, some fatigue  RESPIRATORY: No cough, No shortness of breath  CARDIOVASCULAR: No chest pain, palpitations  GASTROINTESTINAL: No abdominal, No nausea, vomiting  NEUROLOGICAL: No headaches,  loss of strength.  MISCELLANEOUS: Right hip pain is well controlled       Vital Signs Last 24 Hrs  T(C): 36.5 (04 Apr 2023 08:00), Max: 36.6 (03 Apr 2023 19:30)  T(F): 97.7 (04 Apr 2023 08:00), Max: 97.8 (03 Apr 2023 19:30)  HR: 64 (04 Apr 2023 08:00) (46 - 67)  BP: 148/72 (04 Apr 2023 08:00) (99/61 - 148/72)  RR: 18 (04 Apr 2023 02:45) (12 - 20)  SpO2: 97% (04 Apr 2023 02:45) (95% - 100%)    Parameters below as of 04 Apr 2023 02:45  Patient On (Oxygen Delivery Method): room air        PHYSICAL EXAM:    GENERAL: Middle age male looking comfortable    HEENT: PERRL, +EOMI  NECK: soft, Supple, No JVD   CHEST/LUNG: Clear to auscultate bilaterally; No wheezing  HEART: S1S2+, Regular rate and rhythm; No murmurs  ABDOMEN: Soft, Nontender, Nondistended; Bowel sounds present  EXTREMITIES:  1+ Peripheral Pulses, No edema, Right hip Joint area cover with dressing, no bleeding or soaking   SKIN: No rashes or lesions  NEURO: AAOX3  PSYCH: normal mood        LABS:                        14.4   21.18 )-----------( 192      ( 04 Apr 2023 07:16 )             43.2     04-04    143  |  105  |  12  ----------------------------<  131<H>  4.4   |  26  |  0.91    Ca    9.1      04 Apr 2023 07:16              I&O's Summary    03 Apr 2023 07:01  -  04 Apr 2023 07:00  --------------------------------------------------------  IN: 1000 mL / OUT: 450 mL / NET: 550 mL        MEDICATIONS  (STANDING):  acetaminophen     Tablet .. 1000 milliGRAM(s) Oral every 8 hours  aspirin enteric coated 81 milliGRAM(s) Oral every 12 hours  atorvastatin 20 milliGRAM(s) Oral at bedtime  celecoxib 200 milliGRAM(s) Oral every 12 hours  lactated ringers. 1000 milliLiter(s) (125 mL/Hr) IV Continuous <Continuous>  pantoprazole    Tablet 40 milliGRAM(s) Oral before breakfast  polyethylene glycol 3350 17 Gram(s) Oral at bedtime  senna 2 Tablet(s) Oral at bedtime    MEDICATIONS  (PRN):  aluminum hydroxide/magnesium hydroxide/simethicone Suspension 30 milliLiter(s) Oral four times a day PRN Indigestion  HYDROmorphone  Injectable 0.5 milliGRAM(s) IV Push every 3 hours PRN Breakthrough pain  magnesium hydroxide Suspension 30 milliLiter(s) Oral daily PRN Constipation  ondansetron Injectable 4 milliGRAM(s) IV Push every 6 hours PRN Nausea and/or Vomiting  oxyCODONE    IR 5 milliGRAM(s) Oral every 3 hours PRN Moderate Pain (4 - 6)  oxyCODONE    IR 10 milliGRAM(s) Oral every 3 hours PRN Severe Pain (7 - 10)  sodium chloride 0.65% Nasal 1 Spray(s) Both Nostrils three times a day PRN Nasal Congestion

## 2023-04-04 NOTE — DISCHARGE NOTE NURSING/CASE MANAGEMENT/SOCIAL WORK - NSSCNAMETXT_GEN_ALL_CORE
[facility] care agency [phone] will reach out to you within 24-72 hours of your discharge to schedule home care visit/eval appointment with you. Please call agency for any queries regarding home care services

## 2023-04-04 NOTE — DISCHARGE NOTE NURSING/CASE MANAGEMENT/SOCIAL WORK - NSDCPEFALRISK_GEN_ALL_CORE
For information on Fall & Injury Prevention, visit: [email or web address] OR  [email or web address] OR  https://www.cdc.gov/steadi/patient.html

## 2023-04-04 NOTE — OCCUPATIONAL THERAPY INITIAL EVALUATION ADULT - LIVES WITH, PROFILE
Pt lives with his wife in a private home, 3 steps to enter, 10 steps inside with a walk in shower. Pt was independent with ADLs, IADLs, functional mobility/transfers, driving prior to admission./spouse

## 2023-04-04 NOTE — PROGRESS NOTE ADULT - SUBJECTIVE AND OBJECTIVE BOX
POST OPERATIVE DAY #:  [1 ]   STATUS POST: [ ] Left [ x] Right  [ ]TKR [x ]THR                        Patient is a 67y old  Male who presents with a chief complaint of Right Anterior Total Hip Replacement  (03 Apr 2023 12:31)      Pain well controlled    OBJECTIVE:     Vital Signs Last 24 Hrs  T(C): 36.4 (04 Apr 2023 02:45), Max: 36.6 (03 Apr 2023 19:30)  T(F): 97.6 (04 Apr 2023 02:45), Max: 97.8 (03 Apr 2023 19:30)  HR: 63 (04 Apr 2023 02:45) (46 - 67)  BP: 106/58 (04 Apr 2023 02:45) (99/61 - 126/77)  BP(mean): --  RR: 18 (04 Apr 2023 02:45) (10 - 20)  SpO2: 97% (04 Apr 2023 02:45) (95% - 100%)    Parameters below as of 04 Apr 2023 02:45  Patient On (Oxygen Delivery Method): room air        Affected extremity:          mipelex Dressing:  clean/dry/intac         Sensation; intact to light touch         Motor exam: Toes warm and mobile        No calf tenderness bilateral LE's    LABS:                        14.4   21.18 )-----------( 192      ( 04 Apr 2023 07:16 )             43.2     04-04    143  |  105  |  12  ----------------------------<  131<H>  4.4   |  26  |  0.91    Ca    9.1      04 Apr 2023 07:16              A/P :    -    Analgesics PRN  -    DVT ppx: [ x]ASA 81 bid [ ] Lovenox [ ] Coumadin   [ ] Eliquis   -    Check AM labs  -    Weight bearing status: WBAT [ x]        PWB    [ ]     TTWB  [ ]      NWB  [ ]  -    Physical Therapy  -    Occupational Therapy  -    Dispo: Home [ x]     Rehab [ ]      MATHEW [ ]      To be determined [ ]

## 2023-04-04 NOTE — DISCHARGE NOTE NURSING/CASE MANAGEMENT/SOCIAL WORK - NSDCDMENAME_GEN_ALL_CORE_FT
[facility] [phone] eryn escobar  [facility] [phone] rolling walker and raised toilet seat

## 2023-04-04 NOTE — OCCUPATIONAL THERAPY INITIAL EVALUATION ADULT - MANUAL MUSCLE TESTING RESULTS, REHAB EVAL
b/l UE and LLE at least 4/5, R knee at least 3/5, R hip not tested 2* surgery/grossly assessed due to

## 2023-04-04 NOTE — CARE COORDINATION ASSESSMENT. - NSDCPLANSERVICES_GEN_ALL_CORE
This CM met at the bedside with the pt. Introduced myself as the CM explained my role and left contact information. The pt verbalized understanding. The pt is s/p R anterior THR. The pt is in agreement with the transition home plan of [facility]. List of choices were given and the pt chose [facility]. Insurance provisions were discussed and the pt verbalized understanding. 485 was sent to [facility] with a SOC on 4/5/23. Pt has a RW and RTS in the home prior to this admission. The pt's wife will transport home and assist in the home as needed. The bedside RN is aware of the transition plan. CM team to remain available for post acute needs./Health Home, Home

## 2023-04-04 NOTE — CARE COORDINATION ASSESSMENT. - NSPASTMEDSURGHISTORY_GEN_ALL_CORE_FT
PAST MEDICAL & SURGICAL HISTORY:  History of tonsillectomy      H/O arthroscopy of right knee  torn meniscus      H/O hernia repair      Hypercholesteremia      Osteoarthritis of right hip      Sleep apnea      History of bilateral carpal tunnel release

## 2023-04-08 LAB
CHOLEST SERPL-MCNC: 195 MG/DL
HDLC SERPL-MCNC: 39 MG/DL
LDLC SERPL CALC-MCNC: 121 MG/DL
NONHDLC SERPL-MCNC: 156 MG/DL
TRIGL SERPL-MCNC: 175 MG/DL

## 2023-04-10 ENCOUNTER — NON-APPOINTMENT (OUTPATIENT)
Age: 68
End: 2023-04-10

## 2023-04-20 ENCOUNTER — APPOINTMENT (OUTPATIENT)
Dept: ORTHOPEDIC SURGERY | Facility: CLINIC | Age: 68
End: 2023-04-20
Payer: MEDICARE

## 2023-04-20 VITALS — TEMPERATURE: 98.3 F | HEART RATE: 54 BPM | DIASTOLIC BLOOD PRESSURE: 70 MMHG | SYSTOLIC BLOOD PRESSURE: 118 MMHG

## 2023-04-20 PROCEDURE — 99024 POSTOP FOLLOW-UP VISIT: CPT

## 2023-04-20 PROCEDURE — 73502 X-RAY EXAM HIP UNI 2-3 VIEWS: CPT | Mod: RT

## 2023-06-20 ENCOUNTER — RX RENEWAL (OUTPATIENT)
Age: 68
End: 2023-06-20

## 2023-06-23 NOTE — ASU PREOP CHECKLIST - SKIN PREP
After IV infiltrated pt refused another IV insertion. Afia Shrestha aware.       Paco Hoover, GRADY  06/23/23 9831 done

## 2023-06-30 ENCOUNTER — APPOINTMENT (OUTPATIENT)
Dept: ORTHOPEDIC SURGERY | Facility: CLINIC | Age: 68
End: 2023-06-30
Payer: MEDICARE

## 2023-06-30 DIAGNOSIS — Z96.641 PRESENCE OF RIGHT ARTIFICIAL HIP JOINT: ICD-10-CM

## 2023-06-30 DIAGNOSIS — Z87.828 PERSONAL HISTORY OF OTHER (HEALED) PHYSICAL INJURY AND TRAUMA: ICD-10-CM

## 2023-06-30 PROCEDURE — 73502 X-RAY EXAM HIP UNI 2-3 VIEWS: CPT | Mod: RT

## 2023-06-30 PROCEDURE — 99024 POSTOP FOLLOW-UP VISIT: CPT

## 2023-06-30 NOTE — HISTORY OF PRESENT ILLNESS
[4] : the patient reports pain that is 4/10 in severity [Clean/Dry/Intact] : clean, dry and intact [Healed] : healed [Neuro Intact] : an unremarkable neurological exam [Vascular Intact] : ~T peripheral vascular exam normal [Negative Adin's] : maneuvers demonstrated a negative Adin's sign [Xray (Date:___)] : [unfilled] Xray -  [Doing Well] : is doing well [Excellent Pain Control] : has excellent pain control [No Sign of Infection] : is showing no signs of infection [Chills] : no chills [Fever] : no fever [Erythema] : not erythematous [Discharge] : absent of discharge [Swelling] : not swollen [Dehiscence] : not dehisced [de-identified] : right hip replacement DOS 4/3/23 [de-identified] : 68-year-old male presents for follow-up of the right hip status post right total hip replacement on 4/3/2023.  Overall in the postoperative period patient is progressing slowly.  He is still having pain in the groin as well as difficulty with sitting for prolonged periods of time.  [facility] to stop several times to get up and walk due to pain.  He is taking Tylenol as needed basis with improvement in symptoms.  He is starting to get back into activities including yoga rest however after activities he has additional pain as well.  Pain does not radiate and stays localized to the groin.  He is also having some numbness to the anterior thigh as well.  He completed physical therapy and is ambulating freely without any assistive devices.\par Denies any fevers chills chest pain calf pain shortness of breath [de-identified] : The patient has stable gait without assistive devices. The incision site is without redness, drainage or heat. There is no palpable tenderness, hematoma or effusion. The right hip actively flexes to 90 degrees with minimal discomfort. The external and internal rotation of the right hip is with minimal discomfort and stiffness. Leg lengths appear equal. There is no evidence of infection or cellulitis.  [de-identified] : Radiographs of the pelvis and right hip were performed today. There are stable interfaces between bone and implant in the femur and acetabulum. There is stable positioning of the femoral and acetabular components. There is no fracture, foreign body, subsidement, osteolysis, or notable effusion. The lesser trochanters of each femur are aligned on Shenton's line. No heterotopic ossification is noted. [de-identified] : Dr. Loredo evaluated this patient, reviewed the radiographs, and is guiding the patients orthopedic management. The patient was advised to continue their routine activities of daily living within tolerances, home exercises as guided by PT and continue out patient PT until goals are reached. Instructions for LE strengthening, ROM, and balance exercises were reviewed.  \par The patient was advised to follow up with PCP, if not done so already, for a post op medical reevaluation including lab work. \par The patient was advised to continue with regular interval orthopedic follow up post THR and may contact our office if any new problems arise for urgent orthopedic re-evaluation.\par All patients questions and concerns were addressed to satisfaction.\par Advised the patient to continue with antibiotics prior to dental procedures as per Dr. Loredo's protocol. \par Patient also mentioned that he has previously known meniscus tear of the left knee however it has been several years since his last and he would like to have knee treated at this time.  We will obtain a updated MRI of the left knee to eval his tear versus DJD.  MRI prescription was provided to the patient.  He will follow-up after the MRI to discuss the results.  All patient's questions and concerns were addressed to satisfaction.

## 2023-07-10 ENCOUNTER — APPOINTMENT (OUTPATIENT)
Age: 68
End: 2023-07-10

## 2023-07-11 ENCOUNTER — APPOINTMENT (OUTPATIENT)
Dept: INTERNAL MEDICINE | Facility: CLINIC | Age: 68
End: 2023-07-11

## 2023-08-21 ENCOUNTER — APPOINTMENT (OUTPATIENT)
Dept: ORTHOPEDIC SURGERY | Facility: CLINIC | Age: 68
End: 2023-08-21
Payer: MEDICARE

## 2023-08-21 VITALS — HEART RATE: 56 BPM | SYSTOLIC BLOOD PRESSURE: 112 MMHG | DIASTOLIC BLOOD PRESSURE: 78 MMHG

## 2023-08-21 PROCEDURE — 72170 X-RAY EXAM OF PELVIS: CPT | Mod: LT

## 2023-08-21 PROCEDURE — 99213 OFFICE O/P EST LOW 20 MIN: CPT

## 2023-08-21 PROCEDURE — 73562 X-RAY EXAM OF KNEE 3: CPT | Mod: LT

## 2023-08-21 NOTE — HISTORY OF PRESENT ILLNESS
[de-identified] : Patient is here following up on.  Complaint of left knee pain patient states that he had an MRI done in May 2022 indicating a meniscal tear of the left knee the patient has been seen and treated at [facility] and from [facility] but unfortunately we do not have a record of the MRI however the patient is evaluated today with regards to his left knee.  Patient states that the knee is giving him significant pain especially reportedly with no problem unrelieved with 3 times a day dose of Advil with or without Tylenol.  Patient describes the pain as aching and becoming painful when activity he denies locking or giving way of the knee.  Patient is recovering from a right total hip replacement that for shadowed his left knee pain.  X-rays were taken in the office today [Worsening] : worsening [___ mths] : [unfilled] month(s) ago [3] : a current pain level of 3/10 [6] : a maximum pain level of 6/10 [Walking] : walking [Standing] : standing [Constant] : ~He/She~ states the symptoms seem to be constant [Knee Flexion] : worsened with knee flexion [Knee Extension] : worsened with knee extension [Acetaminophen] : not relieved by acetaminophen [NSAIDs] : not relieved by nonsteroidal anti-inflammatory drugs

## 2023-08-21 NOTE — PHYSICAL EXAM
[LE] : Sensory: Intact in bilateral lower extremities [DP] : dorsalis pedis 2+ and symmetric bilaterally [PT] : posterior tibial 2+ and symmetric bilaterally [Normal RLE] : Right Lower Extremity: No scars, rashes, lesions, ulcers, skin intact [Normal LLE] : Left Lower Extremity: No scars, rashes, lesions, ulcers, skin intact [Normal Touch] : sensation intact for touch [Normal] : no peripheral adenopathy appreciated [de-identified] : X-rays of the left knee were taken in the office today and reviewed prior to the examination [de-identified] : Patient ambulates without any significant antalgic gait he is able to assess the examining couch without difficulty examining the knee the knee shows a 2-3/5 popliteal fullness.  There is 1-2+ medial lateral laxity of the knee joint range of motion shows full extension and flexion beyond 120 degrees patient is tender over the medial joint line but not specifically through the medial collateral ligament.  Pauline's examination does not show any clicking on compression of either the medial or the lateral meniscus neurovascular status of the extremities are within normal limits good strength against resistance in EHL and dorsiflexion [de-identified] : 3 views of the left knee done in the office today show significant thinning of the medial and lateral joint line with early spurring at the medial and lateral border of the proximal tibia.  [place] view shows significant thinning of the patella cartilage with debris on either side of the trochlear groove

## 2023-09-05 ENCOUNTER — APPOINTMENT (OUTPATIENT)
Dept: MRI IMAGING | Facility: CLINIC | Age: 68
End: 2023-09-05
Payer: MEDICARE

## 2023-09-05 ENCOUNTER — OUTPATIENT (OUTPATIENT)
Dept: OUTPATIENT SERVICES | Facility: HOSPITAL | Age: 68
LOS: 1 days | End: 2023-09-05
Payer: MEDICARE

## 2023-09-05 DIAGNOSIS — Z98.890 OTHER SPECIFIED POSTPROCEDURAL STATES: Chronic | ICD-10-CM

## 2023-09-05 DIAGNOSIS — Z90.89 ACQUIRED ABSENCE OF OTHER ORGANS: Chronic | ICD-10-CM

## 2023-09-05 DIAGNOSIS — Z87.828 PERSONAL HISTORY OF OTHER (HEALED) PHYSICAL INJURY AND TRAUMA: ICD-10-CM

## 2023-09-05 PROCEDURE — 73721 MRI JNT OF LWR EXTRE W/O DYE: CPT | Mod: 26,LT

## 2023-09-05 PROCEDURE — 73721 MRI JNT OF LWR EXTRE W/O DYE: CPT

## 2023-09-28 ENCOUNTER — APPOINTMENT (OUTPATIENT)
Dept: ORTHOPEDIC SURGERY | Facility: CLINIC | Age: 68
End: 2023-09-28
Payer: MEDICARE

## 2023-09-28 VITALS
HEART RATE: 54 BPM | SYSTOLIC BLOOD PRESSURE: 115 MMHG | DIASTOLIC BLOOD PRESSURE: 72 MMHG | BODY MASS INDEX: 23.99 KG/M2 | HEIGHT: 69 IN | WEIGHT: 162 LBS

## 2023-09-28 DIAGNOSIS — M17.12 UNILATERAL PRIMARY OSTEOARTHRITIS, LEFT KNEE: ICD-10-CM

## 2023-09-28 PROCEDURE — 99214 OFFICE O/P EST MOD 30 MIN: CPT

## 2023-10-12 NOTE — OCCUPATIONAL THERAPY INITIAL EVALUATION ADULT - ADDITIONAL COMMENTS
PAST MEDICAL HISTORY:  Asthmatic bronchitis well-controlled, no recent exacerbations    Carpal tunnel syndrome left    Cervicalgia     COVID-19 Pt had Covid-19 3/2020, did not require hospitalization    GERD (gastroesophageal reflux disease)     Head and neck cancer tonsil - diagnosed 1/2020 - s/p 35 radiation treatments and 3 cycles of chemotherapy, followed by Dr Tovar ([facility])    Hypertension     Osteoarthritis     Pericardial effusion longstanding h/o small pericardial effusion - last Echo 2017 revealed trace pericardial effusion    Samter's triad     
Pt provided with educational resource folder on total joint replacement.

## 2023-11-21 ENCOUNTER — OUTPATIENT (OUTPATIENT)
Dept: OUTPATIENT SERVICES | Facility: HOSPITAL | Age: 68
LOS: 1 days | End: 2023-11-21
Payer: MEDICARE

## 2023-11-21 VITALS
DIASTOLIC BLOOD PRESSURE: 77 MMHG | RESPIRATION RATE: 12 BRPM | HEIGHT: 69 IN | TEMPERATURE: 98 F | OXYGEN SATURATION: 97 % | WEIGHT: 162.04 LBS | HEART RATE: 53 BPM | SYSTOLIC BLOOD PRESSURE: 127 MMHG

## 2023-11-21 DIAGNOSIS — M25.562 PAIN IN LEFT KNEE: ICD-10-CM

## 2023-11-21 DIAGNOSIS — Z96.641 PRESENCE OF RIGHT ARTIFICIAL HIP JOINT: Chronic | ICD-10-CM

## 2023-11-21 DIAGNOSIS — Z01.818 ENCOUNTER FOR OTHER PREPROCEDURAL EXAMINATION: ICD-10-CM

## 2023-11-21 DIAGNOSIS — M17.12 UNILATERAL PRIMARY OSTEOARTHRITIS, LEFT KNEE: ICD-10-CM

## 2023-11-21 DIAGNOSIS — Z98.890 OTHER SPECIFIED POSTPROCEDURAL STATES: Chronic | ICD-10-CM

## 2023-11-21 DIAGNOSIS — Z90.89 ACQUIRED ABSENCE OF OTHER ORGANS: Chronic | ICD-10-CM

## 2023-11-21 LAB
ALBUMIN SERPL ELPH-MCNC: 4.2 G/DL — SIGNIFICANT CHANGE UP (ref 3.3–5)
ALBUMIN SERPL ELPH-MCNC: 4.2 G/DL — SIGNIFICANT CHANGE UP (ref 3.3–5)
ALP SERPL-CCNC: 122 U/L — HIGH (ref 30–120)
ALP SERPL-CCNC: 122 U/L — HIGH (ref 30–120)
ALT FLD-CCNC: 35 U/L — SIGNIFICANT CHANGE UP (ref 10–60)
ALT FLD-CCNC: 35 U/L — SIGNIFICANT CHANGE UP (ref 10–60)
ANION GAP SERPL CALC-SCNC: 7 MMOL/L — SIGNIFICANT CHANGE UP (ref 5–17)
ANION GAP SERPL CALC-SCNC: 7 MMOL/L — SIGNIFICANT CHANGE UP (ref 5–17)
AST SERPL-CCNC: 25 U/L — SIGNIFICANT CHANGE UP (ref 10–40)
AST SERPL-CCNC: 25 U/L — SIGNIFICANT CHANGE UP (ref 10–40)
BILIRUB SERPL-MCNC: 0.7 MG/DL — SIGNIFICANT CHANGE UP (ref 0.2–1.2)
BILIRUB SERPL-MCNC: 0.7 MG/DL — SIGNIFICANT CHANGE UP (ref 0.2–1.2)
BUN SERPL-MCNC: 23 MG/DL — SIGNIFICANT CHANGE UP (ref 7–23)
BUN SERPL-MCNC: 23 MG/DL — SIGNIFICANT CHANGE UP (ref 7–23)
CALCIUM SERPL-MCNC: 9.5 MG/DL — SIGNIFICANT CHANGE UP (ref 8.4–10.5)
CALCIUM SERPL-MCNC: 9.5 MG/DL — SIGNIFICANT CHANGE UP (ref 8.4–10.5)
CHLORIDE SERPL-SCNC: 102 MMOL/L — SIGNIFICANT CHANGE UP (ref 96–108)
CHLORIDE SERPL-SCNC: 102 MMOL/L — SIGNIFICANT CHANGE UP (ref 96–108)
CO2 SERPL-SCNC: 29 MMOL/L — SIGNIFICANT CHANGE UP (ref 22–31)
CO2 SERPL-SCNC: 29 MMOL/L — SIGNIFICANT CHANGE UP (ref 22–31)
CREAT SERPL-MCNC: 0.89 MG/DL — SIGNIFICANT CHANGE UP (ref 0.5–1.3)
CREAT SERPL-MCNC: 0.89 MG/DL — SIGNIFICANT CHANGE UP (ref 0.5–1.3)
EGFR: 95 ML/MIN/1.73M2 — SIGNIFICANT CHANGE UP
EGFR: 95 ML/MIN/1.73M2 — SIGNIFICANT CHANGE UP
GLUCOSE SERPL-MCNC: 100 MG/DL — HIGH (ref 70–99)
GLUCOSE SERPL-MCNC: 100 MG/DL — HIGH (ref 70–99)
HCT VFR BLD CALC: 44.5 % — SIGNIFICANT CHANGE UP (ref 39–50)
HCT VFR BLD CALC: 44.5 % — SIGNIFICANT CHANGE UP (ref 39–50)
HGB BLD-MCNC: 15 G/DL — SIGNIFICANT CHANGE UP (ref 13–17)
HGB BLD-MCNC: 15 G/DL — SIGNIFICANT CHANGE UP (ref 13–17)
MCHC RBC-ENTMCNC: 31.1 PG — SIGNIFICANT CHANGE UP (ref 27–34)
MCHC RBC-ENTMCNC: 31.1 PG — SIGNIFICANT CHANGE UP (ref 27–34)
MCHC RBC-ENTMCNC: 33.7 GM/DL — SIGNIFICANT CHANGE UP (ref 32–36)
MCHC RBC-ENTMCNC: 33.7 GM/DL — SIGNIFICANT CHANGE UP (ref 32–36)
MCV RBC AUTO: 92.3 FL — SIGNIFICANT CHANGE UP (ref 80–100)
MCV RBC AUTO: 92.3 FL — SIGNIFICANT CHANGE UP (ref 80–100)
NRBC # BLD: 0 /100 WBCS — SIGNIFICANT CHANGE UP (ref 0–0)
NRBC # BLD: 0 /100 WBCS — SIGNIFICANT CHANGE UP (ref 0–0)
PLATELET # BLD AUTO: 211 K/UL — SIGNIFICANT CHANGE UP (ref 150–400)
PLATELET # BLD AUTO: 211 K/UL — SIGNIFICANT CHANGE UP (ref 150–400)
POTASSIUM SERPL-MCNC: 4.7 MMOL/L — SIGNIFICANT CHANGE UP (ref 3.5–5.3)
POTASSIUM SERPL-MCNC: 4.7 MMOL/L — SIGNIFICANT CHANGE UP (ref 3.5–5.3)
POTASSIUM SERPL-SCNC: 4.7 MMOL/L — SIGNIFICANT CHANGE UP (ref 3.5–5.3)
POTASSIUM SERPL-SCNC: 4.7 MMOL/L — SIGNIFICANT CHANGE UP (ref 3.5–5.3)
PROT SERPL-MCNC: 7.5 G/DL — SIGNIFICANT CHANGE UP (ref 6–8.3)
PROT SERPL-MCNC: 7.5 G/DL — SIGNIFICANT CHANGE UP (ref 6–8.3)
RBC # BLD: 4.82 M/UL — SIGNIFICANT CHANGE UP (ref 4.2–5.8)
RBC # BLD: 4.82 M/UL — SIGNIFICANT CHANGE UP (ref 4.2–5.8)
RBC # FLD: 13.2 % — SIGNIFICANT CHANGE UP (ref 10.3–14.5)
RBC # FLD: 13.2 % — SIGNIFICANT CHANGE UP (ref 10.3–14.5)
SODIUM SERPL-SCNC: 138 MMOL/L — SIGNIFICANT CHANGE UP (ref 135–145)
SODIUM SERPL-SCNC: 138 MMOL/L — SIGNIFICANT CHANGE UP (ref 135–145)
WBC # BLD: 5.9 K/UL — SIGNIFICANT CHANGE UP (ref 3.8–10.5)
WBC # BLD: 5.9 K/UL — SIGNIFICANT CHANGE UP (ref 3.8–10.5)
WBC # FLD AUTO: 5.9 K/UL — SIGNIFICANT CHANGE UP (ref 3.8–10.5)
WBC # FLD AUTO: 5.9 K/UL — SIGNIFICANT CHANGE UP (ref 3.8–10.5)

## 2023-11-21 PROCEDURE — 85027 COMPLETE CBC AUTOMATED: CPT

## 2023-11-21 PROCEDURE — 80053 COMPREHEN METABOLIC PANEL: CPT

## 2023-11-21 PROCEDURE — G0463: CPT

## 2023-11-21 PROCEDURE — 93005 ELECTROCARDIOGRAM TRACING: CPT

## 2023-11-21 PROCEDURE — 93010 ELECTROCARDIOGRAM REPORT: CPT

## 2023-11-21 PROCEDURE — 36415 COLL VENOUS BLD VENIPUNCTURE: CPT

## 2023-11-21 NOTE — H&P PST ADULT - PROBLEM SELECTOR PLAN 1
Left knee arthroscopy is planned for 12/11/2023  Diagnostic testing performed  medical clearance requested  Pre op instructions were reviewed  best wishes offered

## 2023-11-21 NOTE — H&P PST ADULT - MUSCULOSKELETAL
no joint erythema/no joint warmth/no calf tenderness/decreased ROM due to pain/extremities exam details…

## 2023-11-21 NOTE — H&P PST ADULT - HISTORY OF PRESENT ILLNESS
69 yo male reports left knee pain with swelling for last year.  Pain is exacerbated with flexion and weight bearing.  He is scheduled for left knee arthroscopy on 12/11/2023 @ [facility].   69 yo male reports left knee pain with swelling for last year.  Pain is exacerbated with flexion and weight bearing.  He is scheduled for left knee arthroscopy on 12/11/2023 @ [facility].   69 yo male reports left knee pain with swelling for last year.  Pain is exacerbated with flexion and weight bearing.  He is scheduled for left knee arthroscopy on 12/11/2023 @ [facility].

## 2023-11-21 NOTE — H&P PST ADULT - NSICDXPASTSURGICALHX_GEN_ALL_CORE_FT
PAST SURGICAL HISTORY:  H/O arthroscopy of right knee torn meniscus    H/O hernia repair     History of bilateral carpal tunnel release     History of right hip replacement     History of tonsillectomy

## 2023-11-27 ENCOUNTER — APPOINTMENT (OUTPATIENT)
Dept: INTERNAL MEDICINE | Facility: CLINIC | Age: 68
End: 2023-11-27
Payer: MEDICARE

## 2023-11-27 VITALS
DIASTOLIC BLOOD PRESSURE: 62 MMHG | OXYGEN SATURATION: 99 % | TEMPERATURE: 98.2 F | HEIGHT: 69 IN | WEIGHT: 168 LBS | BODY MASS INDEX: 24.88 KG/M2 | SYSTOLIC BLOOD PRESSURE: 102 MMHG | HEART RATE: 82 BPM

## 2023-11-27 DIAGNOSIS — Z01.818 ENCOUNTER FOR OTHER PREPROCEDURAL EXAMINATION: ICD-10-CM

## 2023-11-27 PROCEDURE — 99213 OFFICE O/P EST LOW 20 MIN: CPT

## 2023-11-27 RX ORDER — AMOXICILLIN 500 MG/1
500 CAPSULE ORAL
Qty: 12 | Refills: 4 | Status: DISCONTINUED | COMMUNITY
Start: 2023-04-20 | End: 2023-11-27

## 2023-11-27 RX ORDER — SULFAMETHOXAZOLE AND TRIMETHOPRIM 800; 160 MG/1; MG/1
800-160 TABLET ORAL TWICE DAILY
Qty: 20 | Refills: 0 | Status: DISCONTINUED | COMMUNITY
Start: 2023-04-07 | End: 2023-11-27

## 2023-11-27 RX ORDER — FAMOTIDINE 20 MG/1
20 TABLET, FILM COATED ORAL DAILY
Qty: 30 | Refills: 1 | Status: DISCONTINUED | COMMUNITY
Start: 2022-12-27 | End: 2023-11-27

## 2023-12-05 PROBLEM — M25.562 PAIN IN LEFT KNEE: Chronic | Status: ACTIVE | Noted: 2023-11-21

## 2023-12-06 ENCOUNTER — NON-APPOINTMENT (OUTPATIENT)
Age: 68
End: 2023-12-06

## 2023-12-07 RX ORDER — CEFAZOLIN SODIUM 1 G
2000 VIAL (EA) INJECTION ONCE
Refills: 0 | Status: DISCONTINUED | OUTPATIENT
Start: 2023-12-11 | End: 2023-12-25

## 2023-12-07 RX ORDER — CHLORHEXIDINE GLUCONATE 213 G/1000ML
1 SOLUTION TOPICAL ONCE
Refills: 0 | Status: DISCONTINUED | OUTPATIENT
Start: 2023-12-11 | End: 2023-12-25

## 2023-12-07 RX ORDER — ACETAMINOPHEN 500 MG
1000 TABLET ORAL ONCE
Refills: 0 | Status: DISCONTINUED | OUTPATIENT
Start: 2023-12-11 | End: 2023-12-25

## 2023-12-07 RX ORDER — APREPITANT 80 MG/1
40 CAPSULE ORAL ONCE
Refills: 0 | Status: DISCONTINUED | OUTPATIENT
Start: 2023-12-11 | End: 2023-12-25

## 2023-12-10 ENCOUNTER — TRANSCRIPTION ENCOUNTER (OUTPATIENT)
Age: 68
End: 2023-12-10

## 2023-12-11 ENCOUNTER — OUTPATIENT (OUTPATIENT)
Dept: OUTPATIENT SERVICES | Facility: HOSPITAL | Age: 68
LOS: 1 days | End: 2023-12-11
Payer: MEDICARE

## 2023-12-11 ENCOUNTER — APPOINTMENT (OUTPATIENT)
Dept: ORTHOPEDIC SURGERY | Facility: HOSPITAL | Age: 68
End: 2023-12-11

## 2023-12-11 ENCOUNTER — TRANSCRIPTION ENCOUNTER (OUTPATIENT)
Age: 68
End: 2023-12-11

## 2023-12-11 VITALS
RESPIRATION RATE: 15 BRPM | DIASTOLIC BLOOD PRESSURE: 64 MMHG | HEART RATE: 55 BPM | SYSTOLIC BLOOD PRESSURE: 111 MMHG | OXYGEN SATURATION: 100 %

## 2023-12-11 VITALS
DIASTOLIC BLOOD PRESSURE: 57 MMHG | SYSTOLIC BLOOD PRESSURE: 114 MMHG | HEART RATE: 52 BPM | HEIGHT: 69 IN | WEIGHT: 166.45 LBS | RESPIRATION RATE: 11 BRPM | TEMPERATURE: 97 F | OXYGEN SATURATION: 99 %

## 2023-12-11 DIAGNOSIS — Z98.890 OTHER SPECIFIED POSTPROCEDURAL STATES: Chronic | ICD-10-CM

## 2023-12-11 DIAGNOSIS — M25.569 PAIN IN UNSPECIFIED KNEE: ICD-10-CM

## 2023-12-11 DIAGNOSIS — Z90.89 ACQUIRED ABSENCE OF OTHER ORGANS: Chronic | ICD-10-CM

## 2023-12-11 DIAGNOSIS — Z96.641 PRESENCE OF RIGHT ARTIFICIAL HIP JOINT: Chronic | ICD-10-CM

## 2023-12-11 DIAGNOSIS — M17.12 UNILATERAL PRIMARY OSTEOARTHRITIS, LEFT KNEE: ICD-10-CM

## 2023-12-11 PROCEDURE — 29881 ARTHRS KNE SRG MNISECTMY M/L: CPT | Mod: LT

## 2023-12-11 PROCEDURE — 97161 PT EVAL LOW COMPLEX 20 MIN: CPT

## 2023-12-11 RX ORDER — SODIUM CHLORIDE 9 MG/ML
1000 INJECTION, SOLUTION INTRAVENOUS
Refills: 0 | Status: DISCONTINUED | OUTPATIENT
Start: 2023-12-11 | End: 2023-12-11

## 2023-12-11 RX ORDER — ONDANSETRON 8 MG/1
4 TABLET, FILM COATED ORAL ONCE
Refills: 0 | Status: DISCONTINUED | OUTPATIENT
Start: 2023-12-11 | End: 2023-12-11

## 2023-12-11 RX ORDER — OXYCODONE HYDROCHLORIDE 5 MG/1
5 TABLET ORAL ONCE
Refills: 0 | Status: DISCONTINUED | OUTPATIENT
Start: 2023-12-11 | End: 2023-12-11

## 2023-12-11 RX ORDER — HYDROMORPHONE HYDROCHLORIDE 2 MG/ML
1 INJECTION INTRAMUSCULAR; INTRAVENOUS; SUBCUTANEOUS
Refills: 0 | Status: DISCONTINUED | OUTPATIENT
Start: 2023-12-11 | End: 2023-12-11

## 2023-12-11 RX ORDER — HYDROMORPHONE HYDROCHLORIDE 2 MG/ML
0.5 INJECTION INTRAMUSCULAR; INTRAVENOUS; SUBCUTANEOUS
Refills: 0 | Status: DISCONTINUED | OUTPATIENT
Start: 2023-12-11 | End: 2023-12-11

## 2023-12-11 RX ORDER — ATORVASTATIN CALCIUM 80 MG/1
1 TABLET, FILM COATED ORAL
Qty: 0 | Refills: 0 | DISCHARGE

## 2023-12-11 RX ADMIN — SODIUM CHLORIDE 75 MILLILITER(S): 9 INJECTION, SOLUTION INTRAVENOUS at 15:19

## 2023-12-11 NOTE — ASU DISCHARGE PLAN (ADULT/PEDIATRIC) - CARE PROVIDER_API CALL
Aubrey Loredo  Orthopaedic Surgery  [address], [address]  [place] [address]  Phone: [phone]  Fax: [phone]  Follow Up Time:    Aubrey Loredo  Orthopaedic Surgery  [address], [address]  [place] [address]  Phone: [phone]  Fax: [phone]  Follow Up Time:

## 2023-12-11 NOTE — PHYSICAL THERAPY INITIAL EVALUATION ADULT - NSACTIVITYREC_GEN_A_PT
Pt is independent with bed mobility, transfers and ambulation with straight cane. Educated pt on ascending/descending stairs with +HR and straight cane, provided pt with written instructions. Pt verbalized understanding/agreement to above recommendations/education. All pts questions answered to pt satisfaction. Pt requires no skilled PT and is cleared from PT services for dc home.

## 2023-12-11 NOTE — ASU DISCHARGE PLAN (ADULT/PEDIATRIC) - NS MD DC FALL RISK RISK
For information on Fall & Injury Prevention, visit: [email or web address] OR  [email or web address] OR  https://www.cdc.gov/steadi/patient.html For information on Fall & Injury Prevention, visit: [email or web address] OR  [email or web address] OR  https://www.cdc.gov/steadi/patient.html

## 2023-12-11 NOTE — PHYSICAL THERAPY INITIAL EVALUATION ADULT - PERTINENT HX OF CURRENT PROBLEM, REHAB EVAL
Pt is a 69 y/o male with left knee medial meniscus tear. Pt is s/p left knee arthroscopy on 12/11/23.

## 2023-12-11 NOTE — ASU DISCHARGE PLAN (ADULT/PEDIATRIC) - ASU DC SPECIAL INSTRUCTIONSFT
ice/elevation left leg  cover portal sites with bandaids after 48 hours  keep incisions clean and dry

## 2023-12-11 NOTE — ASU PATIENT PROFILE, ADULT - FALL HARM RISK - UNIVERSAL INTERVENTIONS
Bed in lowest position, wheels locked, appropriate side rails in place/Call bell, personal items and telephone in reach/Instruct patient to call for assistance before getting out of bed or chair/Non-slip footwear when patient is out of bed/[place] to call system/Physically safe environment - no spills, clutter or unnecessary equipment/Purposeful Proactive Rounding/Room/bathroom lighting operational, light cord in reach Bed in lowest position, wheels locked, appropriate side rails in place/Call bell, personal items and telephone in reach/Instruct patient to call for assistance before getting out of bed or chair/Non-slip footwear when patient is out of bed/[place] to call system/Physically safe environment - no spills, clutter or unnecessary equipment/Purposeful Proactive Rounding/Room/bathroom lighting operational, light cord in reach

## 2023-12-11 NOTE — ASU DISCHARGE PLAN (ADULT/PEDIATRIC) - CALL YOUR DOCTOR IF YOU HAVE ANY OF THE FOLLOWING:
Bleeding that does not stop/Swelling that gets worse/Pain not relieved by Medications/Fever greater than (need to indicate Fahrenheit or Celsius)/Wound/Surgical Site with redness, or foul smelling discharge or pus/Numbness, tingling, color or temperature change to extremity/Increased irritability or sluggishness

## 2023-12-11 NOTE — ASU PATIENT PROFILE, ADULT - VISION (WITH CORRECTIVE LENSES IF THE PATIENT USUALLY WEARS THEM):
Partially impaired: cannot see medication labels or newsprint, but can see obstacles in path, and the surrounding layout; can count fingers at arm's length
Abdomen soft, non-tender, no guarding.

## 2023-12-11 NOTE — PHYSICAL THERAPY INITIAL EVALUATION ADULT - NSPTDMEREC_GEN_A_CORE
Provided pt with straight cane to ensure safety during transfers/ambulation/stair negotiation due to decreased strength, balance and pain following surgery.

## 2023-12-11 NOTE — BRIEF OPERATIVE NOTE - NSICDXBRIEFPREOP_GEN_ALL_CORE_FT
PRE-OP DIAGNOSIS:  Acute medial meniscal tear 11-Dec-2023 11:11:25 Left Kne mild DJD, medial meniscal tear left knee Farhad Castillo

## 2023-12-11 NOTE — BRIEF OPERATIVE NOTE - NSICDXBRIEFPROCEDURE_GEN_ALL_CORE_FT
PROCEDURES:  Knee arthroscopy, left 11-Dec-2023 11:10:48 Partial medial Menisectomy Farhad Castillo

## 2023-12-22 ENCOUNTER — RX RENEWAL (OUTPATIENT)
Age: 68
End: 2023-12-22

## 2023-12-22 RX ORDER — ATORVASTATIN CALCIUM 20 MG/1
20 TABLET, FILM COATED ORAL
Qty: 90 | Refills: 1 | Status: ACTIVE | COMMUNITY
Start: 2022-09-05 | End: 1900-01-01

## 2023-12-28 ENCOUNTER — APPOINTMENT (OUTPATIENT)
Dept: ORTHOPEDIC SURGERY | Facility: CLINIC | Age: 68
End: 2023-12-28
Payer: MEDICARE

## 2023-12-28 VITALS — DIASTOLIC BLOOD PRESSURE: 71 MMHG | SYSTOLIC BLOOD PRESSURE: 119 MMHG | HEART RATE: 55 BPM

## 2023-12-28 PROCEDURE — 73562 X-RAY EXAM OF KNEE 3: CPT | Mod: LT

## 2023-12-28 PROCEDURE — 99024 POSTOP FOLLOW-UP VISIT: CPT

## 2023-12-28 RX ORDER — AMOXICILLIN 500 MG/1
500 CAPSULE ORAL
Qty: 12 | Refills: 2 | Status: ACTIVE | COMMUNITY
Start: 2023-12-28 | End: 1900-01-01

## 2023-12-28 RX ORDER — IBUPROFEN 600 MG/1
600 TABLET, FILM COATED ORAL
Refills: 0 | Status: ACTIVE | COMMUNITY

## 2023-12-28 NOTE — HISTORY OF PRESENT ILLNESS
[___ Weeks Post Op] : [unfilled] weeks post op [2] : the patient reports pain that is 2/10 in severity [Chills] : no chills [Constipation] : no constipation [Diarrhea] : no diarrhea [Dysuria] : no dysuria [Fever] : no fever [Nausea] : no nausea [Vomiting] : no vomiting [Clean/Dry/Intact] : clean, dry and intact [Erythema] : not erythematous [Discharge] : absent of discharge [Swelling] : swollen [Neuro Intact] : an unremarkable neurological exam [Vascular Intact] : ~T peripheral vascular exam normal [Negative Adin's] : maneuvers demonstrated a negative Adin's sign [Xray (Date:___)] : [unfilled] Xray -  [No Obvious Fractures] : no obvious fractures [Doing Well] : is doing well [No Sign of Infection] : is showing no signs of infection [Adequate Pain Control] : has adequate pain control [de-identified] : Left knee arthroscopy 12/11/23. The patient is a 68 yr old male presents today for his first postoperative visit and suture removal. He is s/p Left knee arthroscopy; medial meniscectomy performed at [facility] on 12/11/2023.  [de-identified] : The patient is doing well and ambulating independently without any assistive device. He has some mild pain/ discomfort and uses ibuprofen for relief. Pain level is 2-3/10. He has been using ice and elevation for the swelling.  [de-identified] : The patient is ambulating with mild antalgic gait. The left knee is with 3 portal sites with sutures intact. Knee exam: Swelling : mild Effusion : none Alignment : neutral Tenderness : mild with palpation              Portals:  3 portal sites with sutures, dry and intact Skin temperature : normal Range of motion: Flexion 110 degrees; Extension 0  Extension lag: none Laxity A-P : none Laxity M-L: none Patellofemoral crepitus: none Quadriceps formation: intact Quad /Ham Strength: 5/5  [de-identified] : No obvious fractures or dislocation noted. Radiographs including 3 views of the right knee were obtained at today's visit. The joint spaces are well maintained. The alignment of the knee is norm. [de-identified] : The sutures were removed from the 3 scope sites to the right knee; the site was cleaned and a band aid was applied. The patient was advised on the nature of the healing process. Emphasis was placed on the need to sit with the leg extended and do isometric exercises to try to promote the knee to come to full extension. Emphasis was also placed on the need to elevate the right lower extremity above the level of the heart and to apply ice 3-4 times a day to decrease swelling. The patient will continue with home exercises. He was instructed to f/u in the office with Dr. Loredo in 4 weeks. He was advised that he may call the office at any time if [she] has any further questions/ concerns. He verbalized understanding.  My cumulative time spent on this patient's visit included: Preparation for the visit, review of the medical records, review of pertinent diagnostic studies, examination and counseling of the patient on the above diagnosis, treatment plan and prognosis, orders of diagnostic tests, medications and/or appropriate procedures and documentation in the medical records of today's visit.  Not including time spent for procedures

## 2024-01-02 NOTE — ASU PATIENT PROFILE, ADULT - AS SC BRADEN MOISTURE
4) rarely moist

## 2024-01-22 ENCOUNTER — APPOINTMENT (OUTPATIENT)
Dept: ORTHOPEDIC SURGERY | Facility: CLINIC | Age: 69
End: 2024-01-22
Payer: MEDICARE

## 2024-01-22 PROCEDURE — 99024 POSTOP FOLLOW-UP VISIT: CPT

## 2024-01-22 NOTE — HISTORY OF PRESENT ILLNESS
[___ Months Post Op] : [unfilled] months post op [Chills] : no chills [Constipation] : no constipation [Diarrhea] : no diarrhea [Dysuria] : no dysuria [Fever] : no fever [Nausea] : no nausea [Vomiting] : no vomiting [Healed] : healed [Clean/Dry/Intact] : clean, dry and intact [Erythema] : not erythematous [Discharge] : absent of discharge [Swelling] : not swollen [Dehiscence] : not dehisced [Neuro Intact] : an unremarkable neurological exam [Vascular Intact] : ~T peripheral vascular exam normal [Negative Adin's] : maneuvers demonstrated a negative Adin's sign [de-identified] : S/P Left knee arthroscopy 12/11/23 [de-identified] : Patient is a 68-year-old male who presents today for an evaluation of his left knee.  He is status post left knee arthroscopy on December 11, 2023.  Overall he states he is still experience some discomfort in his left knee.  Particularly with bending his knee or weightbearing activities.  He states that the pain is mostly on the medial aspect of his knee.  Does not recall any specific injury or accident.  And has tried conservative management including ice packs/moist heat and pain medications when needed.  He states that he did not try physical therapy at this present time due to his discomfort. [de-identified] : On physical examination of the left knee.  Patient is status post arthroscopy in December.  There are well-healed surgical scars with no evidence of infection superficial or deep.  There is some pain and tenderness primarily at the medial aspect as well as posterior medial aspect of the knee.  He does have great range of motion.  However when flexing his knee elicits an increased discomfort in the medial lateral aspect.  There is no evidence of ligamentous laxity.  No evidence of any muscle atrophy.  No evidence of any motor or sensory deficit.  Patient has good distal pulses with no calf tenderness. [de-identified] : No x-rays were performed today [de-identified] : Status post left knee arthroscopy on December 11, 2023 [de-identified] : Plan for the patient is to continue with his present activities.  He is advised to continue with an exercise program.  I was given a prescription to attend therapy 2-3 times a week for the next 6 to 8 weeks.  He was also advised to continue with ice packs/moist heat and anti-inflammatories as needed.  Patient did request an additional MRI to further evaluate the left knee and to see if there is any additional findings.  He was explained that many times after a positive result and or with surgery.  This result may continue to be positive.  However he did request that.  He was offered a cortisone injection here in the office today.  But states that he would like to try conservative management first to see if this pain is alleviated with some outpatient physical therapy.  Patient will return back to the office in another month however if he experiences any increase in pain redness swelling heat or discharge to notify the office.

## 2024-01-30 ENCOUNTER — APPOINTMENT (OUTPATIENT)
Dept: ORTHOPEDIC SURGERY | Facility: CLINIC | Age: 69
End: 2024-01-30
Payer: MEDICARE

## 2024-01-30 ENCOUNTER — APPOINTMENT (OUTPATIENT)
Dept: MRI IMAGING | Facility: CLINIC | Age: 69
End: 2024-01-30
Payer: MEDICARE

## 2024-01-30 ENCOUNTER — OUTPATIENT (OUTPATIENT)
Dept: OUTPATIENT SERVICES | Facility: HOSPITAL | Age: 69
LOS: 1 days | End: 2024-01-30
Payer: MEDICARE

## 2024-01-30 VITALS
SYSTOLIC BLOOD PRESSURE: 130 MMHG | DIASTOLIC BLOOD PRESSURE: 80 MMHG | HEIGHT: 69 IN | HEART RATE: 59 BPM | WEIGHT: 165 LBS | BODY MASS INDEX: 24.44 KG/M2

## 2024-01-30 DIAGNOSIS — Z90.89 ACQUIRED ABSENCE OF OTHER ORGANS: Chronic | ICD-10-CM

## 2024-01-30 DIAGNOSIS — Z98.890 OTHER SPECIFIED POSTPROCEDURAL STATES: Chronic | ICD-10-CM

## 2024-01-30 DIAGNOSIS — M23.92 UNSPECIFIED INTERNAL DERANGEMENT OF LEFT KNEE: ICD-10-CM

## 2024-01-30 DIAGNOSIS — Z96.641 PRESENCE OF RIGHT ARTIFICIAL HIP JOINT: Chronic | ICD-10-CM

## 2024-01-30 PROCEDURE — 73721 MRI JNT OF LWR EXTRE W/O DYE: CPT

## 2024-01-30 PROCEDURE — 73721 MRI JNT OF LWR EXTRE W/O DYE: CPT | Mod: 26,LT,MH

## 2024-01-30 PROCEDURE — 99024 POSTOP FOLLOW-UP VISIT: CPT

## 2024-01-30 RX ORDER — METHYLPREDNISOLONE 4 MG/1
4 TABLET ORAL
Qty: 1 | Refills: 0 | Status: ACTIVE | COMMUNITY
Start: 2024-01-30 | End: 1900-01-01

## 2024-02-01 NOTE — HISTORY OF PRESENT ILLNESS
[de-identified] : GA HARO  is an 68 year male  presents today for follow-up of left knee pain.  He had a history of a meniscus tear and underwent arthroscopic surgery on 12/11/2023.  He reports that his pain is worsened since the surgery and is currently 7 out of 10 in severity.  Pain is located medially and is worse with activity   No fever, chills, or signs of infection. Today, patient does not state any other associated signs or complaints outside of those described.

## 2024-02-01 NOTE — DISCUSSION/SUMMARY
[de-identified] : GA HARO  is an 68 year-old male who presents to the clinic with 6 weeks of left knee pain.  The symptoms began after a procedure. The patient initially tried OTC medications/NSAIDs with some relief, even though short lasting. Radiographic findings show no obvious fracture or deformity, however patient has had continued pain and is unable to return to their usual activities. Given the history along with the physical exam findings of medial femoral condyle and medial joint line pain and pain with deep squat in the setting of a previous procedure, I am concerned about osteonecrosis. I discussed that radiographs show the bones well but do not show early bone marrow edema or osteonecrotic changes well. At this point, the patient is quite debilitated by his  pain and is unable to return to his  activities of daily living such as walking without pain. Given the persistent symptoms, I discussed with the patient that his should continue with a structured home exercise protocol and avoid strenuous activities while we obtain an MRI to further evaluate for an internal derangement of the knee. The office will work to obtain the MRI.   I discussed with them that I often prescribe an anti-inflammatory that should be taken once a day with meals to decrease pain and expedite symptom relief. They should not take this while also taking Aleve (Naprosyn), Motrin/ Advil (Ibuprofen), Toradol (ketoralac). They must stop taking it if they develops stomach pain, increased bleeding or bruising and they should follow-up with their primary care doctor for routine blood work including kidney function to monitor its effect. While it Is not a habit-forming substance, it should only  be taken as needed and to discontinue use once symptoms have resolved.  He is already taking this medication   In the interim, the patient should continue to walk and perform the structured home exercises in the packet and take anti-inflammatory medications as directed if not medically contraindicated. They will schedule follow-up for in person review of the MRI results. They know to call the office or present to the ER if they develop worsening pain, swelling, numbness, tingling, inability to use the leg.   My cumulative time spent on this patients visit included: Preparation for the visit, review of the medical records, review of pertinent diagnostic studies, examination and counseling of the patient on the above diagnosis, treatment plan and prognosis, orders of diagnostic tests, medications and/or appropriate procedures and documentation in the medical records of todays visit.

## 2024-02-01 NOTE — PHYSICAL EXAM
[de-identified] : General Appearance / Station: Well developed, well nourished, in no acute distress  Orientation: Oriented to person, place, and time Gait & Station: Ambulates without assistive device Neurologic: Normal leg sensation  Cardiovascular: Warm extremity  Lymphatics: No lymphedema  Generalized Ligament Laxity: Normal  Stiffness: Normal   LUMBAR SPINE: Nontender  at lumbar spine Straight leg raise: Negative  Motor: 5/5 motor L2-S1 Sensation: Intact  L2-S1  LEFT HIP: Range of motion: Painless  internal and external rotation of the hip. Strength: Within Normal Limits  Palpation: Nontender  at greater trochanter. Nontender  at SI joint Stinchfield: Negative  FADIR: Negative  NUVIA: Negative   SYMPTOMATIC LEFT KNEE: Alignment: Neutral  Skin: normal.  No erythema, warmth or swelling.  There are well-healed arthroscopy scars Effusion: none . Quadriceps: normal . Range of motion: symmetrical but painful . PF crepitus: 1+. PF apprehension: none . Patella / Patella Tendon: nontender . Lachman's: negative  Valgus @ 30: negative. Varus @ 30: negative. Posterior drawer: negative. Palpation: TENDER AT MEDIAL JOINT LINE and medial femoral condyle Meniscus signs: MEDIAL JOINT LINE

## 2024-02-06 ENCOUNTER — APPOINTMENT (OUTPATIENT)
Dept: ORTHOPEDIC SURGERY | Facility: CLINIC | Age: 69
End: 2024-02-06
Payer: MEDICARE

## 2024-02-06 VITALS
SYSTOLIC BLOOD PRESSURE: 126 MMHG | HEIGHT: 69 IN | DIASTOLIC BLOOD PRESSURE: 74 MMHG | WEIGHT: 165 LBS | HEART RATE: 75 BPM | BODY MASS INDEX: 24.44 KG/M2

## 2024-02-06 PROCEDURE — 99213 OFFICE O/P EST LOW 20 MIN: CPT | Mod: 24,25

## 2024-02-06 PROCEDURE — 20610 DRAIN/INJ JOINT/BURSA W/O US: CPT | Mod: 79,LT

## 2024-02-06 RX ORDER — METHYLPRED ACET/NACL,ISO-OS/PF 40 MG/ML
40 VIAL (ML) INJECTION
Qty: 1 | Refills: 0 | Status: COMPLETED | OUTPATIENT
Start: 2024-02-06

## 2024-02-06 RX ADMIN — METHYLPREDNISOLONE ACETATE MG/ML: 40 INJECTION, SUSPENSION INTRA-ARTICULAR; INTRALESIONAL; INTRAMUSCULAR; SOFT TISSUE at 00:00

## 2024-02-06 RX ADMIN — LIDOCAINE HYDROCHLORIDE %: 10 INJECTION, SOLUTION INFILTRATION; PERINEURAL at 00:00

## 2024-02-06 NOTE — REASON FOR VISIT
[Follow-Up Visit] : a follow-up visit for [FreeTextEntry2] : Left knee arthoscopy 12/11/23. MRI review 1/30/23

## 2024-02-06 NOTE — HISTORY OF PRESENT ILLNESS
[de-identified] : Left knee arthoscopy 12/11/23. MRI review 1/30/23 Patient is a 68-year-old gentleman well-known to this office.  He status post a right anterior total hip replacement done May 3, 2023 with good results.  The patient is status post left knee arthroscopic surgery with partial medial meniscectomy on December 11, 2023.  The patient over the past several months has had pain and discomfort in and around the left knee.  The patient did see Dr. Christine not too long ago and a repeat MRI was ordered of the left knee.  His previous MRI prior to the arthroscopic surgery patient did have some degenerative joint disease he is here for MRI review and comparison to his previous MRI.  The patient states that he was placed on a Medrol Dosepak which abated some of his symptoms of discomfort in the knee.  Presently he is ambulating without any assistive devices.  He is taking anti-inflammatories on appearing basis for the discomfort. [Stable] : stable [4] : a current pain level of 4/10 [2] : an average pain level of 2/10 [1] : a minimum pain level of 1/10 [5] : a maximum pain level of 5/10 [Standing] : standing [Intermit.] : ~He/She~ states the symptoms seem to be intermittent [Lifting] : worsened by lifting [Running] : worsened by running [Walking] : worsened by walking [Acetaminophen] : relieved by acetaminophen [Exercise Regimen] : relieved by exercise regimen [Heat] : relieved by heat [Ice] : relieved by ice [Rest] : relieved by rest [Ataxia] : no ataxia [Incontinence] : no incontinence [Loss of Dexterity] : good dexterity [Urinary Ret.] : no urinary retention

## 2024-02-06 NOTE — PROCEDURE
[Injection] : Injection [Left] : of the left [Effusion] : Effusion [Osteoarthritis] : Osteoarthritis [Inflammation] : Inflammation [Diagnostic] : Diagnostic [Joint Pain] : Joint pain [Patient] : patient [Risk] : risk [Benefits] : benefits [Alternatives] : alternatives [Bleeding] : bleeding [Allergic Reaction] : allergic reaction [Ethyl Chloride Spray] : ethyl chloride spray was used as a topical anesthetic [Medial] : medial [1% Lidocaine___(mL)] : [unfilled] mL of 1% Lidocaine [Methylpred. 40mg/mL___(mL)] : [unfilled] mL of 40mg/mL methylprednisolone [Bandage Applied] : a bandage [PRN] : as needed [FreeTextEntry1] : Chlorhexidine

## 2024-02-06 NOTE — DISCUSSION/SUMMARY
[Medication Risks Reviewed] : Medication risks reviewed [de-identified] : The patient has left knee pain over the past several months affecting his activities of daily living.  He does have arthritic changes noted on recent MRI.  Patient was given a cortisone injection today to temporize his symptoms.  He was thoroughly educated on postinjection expectations.  He will continue an exercise program of walking, strength training.  Patient will return in 2 weeks.  Patient may be a good candidate for viscosupplementation injections.  All of his questions were answered to his satisfaction.

## 2024-02-06 NOTE — PHYSICAL EXAM
[UE/LE] : Sensory: Intact in bilateral upper & lower extremities [ALL] : dorsalis pedis, posterior tibial, femoral, popliteal, and radial 2+ and symmetric bilaterally [Normal] : No costovertebral angle tenderness and no spinal tenderness [de-identified] : Skin is clean, dry, intact the left knee.  Range of motion 0-1 35.  Less than 5 degree laxity with varus valgus stresses.  Negative anteroposterior drawer.  No extension lag.  No flexion contractures.  Positive medial joint line tenderness.  Calves are soft, nontender, no evidence of DVT at this time.  Patient is good motor and sensory to both legs. [de-identified] : MRI of the left knee which I personally reviewed interval progression of mild to moderate medial and patellofemoral compartment chondral wear.

## 2024-02-20 ENCOUNTER — APPOINTMENT (OUTPATIENT)
Dept: ORTHOPEDIC SURGERY | Facility: CLINIC | Age: 69
End: 2024-02-20
Payer: MEDICARE

## 2024-02-20 VITALS — HEART RATE: 56 BPM | SYSTOLIC BLOOD PRESSURE: 120 MMHG | DIASTOLIC BLOOD PRESSURE: 76 MMHG

## 2024-02-20 DIAGNOSIS — S83.242A OTHER TEAR OF MEDIAL MENISCUS, CURRENT INJURY, LEFT KNEE, INITIAL ENCOUNTER: ICD-10-CM

## 2024-02-20 DIAGNOSIS — M25.562 PAIN IN LEFT KNEE: ICD-10-CM

## 2024-02-20 DIAGNOSIS — Z98.890 OTHER SPECIFIED POSTPROCEDURAL STATES: ICD-10-CM

## 2024-02-20 PROCEDURE — 99024 POSTOP FOLLOW-UP VISIT: CPT

## 2024-02-20 NOTE — HISTORY OF PRESENT ILLNESS
[___ Months Post Op] : [unfilled] months post op [3] : the patient reports pain that is 3/10 in severity [Doing Well] : is doing well [Excellent Pain Control] : has excellent pain control [No Sign of Infection] : is showing no signs of infection [de-identified] : S/P Left Knee Arthroscopy 12/11/23 [de-identified] : Established patient, he presents for interval office F/U post Left Knee Arthroscopy as above for Medial meniscal tear. LAst OV 2/6/24. Received Intra-articular Steroid injection, PT stopped. Routine activity, home exercises, and Icing daily. Noting significant improvement in walking, ADLs, knee swelling, and activity related knee pain. PAin level no 3-4/10. Worst with stair & chair use. No new falls. Walking in stores with no difficulty. No night pain now. No pain Rx for min. knee pain. No knee buckling. Not currently working due to no schaeduled jobs.  with driving commute. [de-identified] : General/Constitutional: Well appearing, non/obese 68 year old M in no apparent distress; height and weight as detailed below; vital signs as detailed below Neuro: Orientation/Mental Status: Awake, alert, oriented to time, place, & person. Balance: Single leg balance intact on Left / Right @ 5 sec. Sensation: intact to fine & deep touch bilat. Feet/toes;  EHL/AT(Peroneal N.): Bilat: 5/5 ;   Vascular: DP: Bilat: 2/3 ; Cap refill 1-2 sec. all toes Calves non tender bilat; No Adin's Sx Bilat. Lymph: No enlarged LN in the popliteal chain bilaterally. Skin(LE): No cellulitis, edema, and min. venous varicosities bilaterally  MS: Gait:  Stable slight [ Left ] gonalgic limp using no AD. Function: There is no difficulty mounting the exam table.   Left Knee: Swelling: No STS; No Cellulitis Effusion: 0 Alignment: Neutral   Tenderness: 0 Incision: ASC portals well healed Skin Temp: Slightly warm ROM: Flexion: [130] deg.; Extension: [0] deg, No Amilcar or Pauline sign in Ext & Int. Rotation- Supine and Prone positions on the exam table. Laxity A-P:  0  Laxity M-L:  0  PF crepitus: 1+ ;  Quadricep formation: attenuated in Extension & Flexion: Quad/Ham St: 4/5  Right  Knee: Swellin Effusion: 0 Alignment: Neutral   Tenderness: 0 Incision: 0 Skin Temp: Normal ROM: Flexion: [140] deg.; Extension: [0] deg, Laxity A-P:  0  Laxity M-L:  0 / PF crepitus: 1+ ; without pain Quadricep formation: Intact  Quad/Ham St: 5/5   [de-identified] : No Xrays today performed.  Xrays 12/28/23 reviewed. MRI LEft knee and report 1/30/24 reviewed. [de-identified] : Dr. Loredo evaluated the PAtient, reviewed the imaging, and is guiding his Orthopedic Management. He was advised that he has clinical improvement post INtra-articulr Steroid injection. He was advised to continue same management, If he worsens another Steroid injection of the LEft knee would be of clinical benefit. The MRI findings of 1/30/24 do not correlate to clinical exam. Clinical picture consistent with Post-Op changes.  Continue home exercise, and home activity. Re-evaluation in 4 weeks.

## 2024-03-26 ENCOUNTER — APPOINTMENT (OUTPATIENT)
Dept: ORTHOPEDIC SURGERY | Facility: CLINIC | Age: 69
End: 2024-03-26
Payer: MEDICARE

## 2024-03-26 VITALS
BODY MASS INDEX: 24.44 KG/M2 | SYSTOLIC BLOOD PRESSURE: 128 MMHG | HEIGHT: 69 IN | DIASTOLIC BLOOD PRESSURE: 81 MMHG | HEART RATE: 56 BPM | WEIGHT: 165 LBS

## 2024-03-26 PROCEDURE — 99024 POSTOP FOLLOW-UP VISIT: CPT

## 2024-03-26 PROCEDURE — 20610 DRAIN/INJ JOINT/BURSA W/O US: CPT | Mod: LT

## 2024-03-26 RX ADMIN — METHYLPREDNISOLONE ACETATE 2 MG/ML: 40 INJECTION, SUSPENSION INTRA-ARTICULAR; INTRALESIONAL; INTRAMUSCULAR; SOFT TISSUE at 00:00

## 2024-03-26 RX ADMIN — LIDOCAINE HYDROCHLORIDE 3 %: 10 INJECTION, SOLUTION EPIDURAL; INFILTRATION; INTRACAUDAL; PERINEURAL at 00:00

## 2024-03-27 RX ORDER — METHYLPRED ACET/NACL,ISO-OS/PF 40 MG/ML
40 VIAL (ML) INJECTION
Qty: 1 | Refills: 0 | Status: COMPLETED | OUTPATIENT
Start: 2024-03-26

## 2024-03-27 RX ORDER — LIDOCAINE HYDROCHLORIDE 10 MG/ML
1 INJECTION, SOLUTION INFILTRATION; PERINEURAL
Refills: 0 | Status: COMPLETED | OUTPATIENT
Start: 2024-03-26

## 2024-03-27 NOTE — HISTORY OF PRESENT ILLNESS
[___ Months Post Op] : [unfilled] months post op [6] : the patient reports pain that is 6/10 in severity [Clean/Dry/Intact] : clean, dry and intact [Healed] : healed [Vascular Intact] : ~T peripheral vascular exam normal [Neuro Intact] : an unremarkable neurological exam [Negative Adin's] : maneuvers demonstrated a negative Adin's sign [Doing Well] : is doing well [No Sign of Infection] : is showing no signs of infection [Chills] : no chills [Constipation] : no constipation [Diarrhea] : no diarrhea [Dysuria] : no dysuria [Fever] : no fever [Nausea] : no nausea [Vomiting] : no vomiting [Erythema] : not erythematous [Discharge] : absent of discharge [Swelling] : not swollen [Dehiscence] : not dehisced [de-identified] : S/P Left total knee arthroscopy [de-identified] : Patient is a 68-year-old male who presents today for an evaluation of his left knee.  He is status post left knee arthroscopy in December.  Overall he states that since his last office visit he did feel an improvement.  He was given an additional cortisone injection last time and advised to continue with his activities.  However this past Friday while walking around the city and doing various sightseeing chores.  He states that he noticed an increase in pain swelling and stiffness in his knee.  He states that he did go home and applied ice and took some pain medications.  Although slightly improved it is still creating discomfort.  Currently he states the pain is a 6 on a scale of 1-10.  He denies any specific injury or accident. [de-identified] : On physical examination of the left knee.  The skin is clean dry and intact with no evidence of infection superficial or deep.  There is no redness heat discharge fever noted.  There is some pain and tenderness both on the medial and lateral aspects of the joint line.  But overall has a good range of motion neurovascularly intact with no evidence of ligamentous laxity.  Patient has good distal pulses and no calf tenderness. [de-identified] : No x-rays were taken today [de-identified] : Status post left knee arthroscopy [de-identified] : Plan for the patient is to continue with his present activities.  He was given an additional cortisone injection here in the office today.  For which she tolerated it well.  He is advised to continue with ice packs/moist heat and anti-inflammatories.  He is also to notify the office on how he is feeling within the next 4 weeks.  If he does feel good improvement and is stable he will continue with conservative measures.  But if the pain is not improving.  An MRI for the left knee will be given to the patient to further evaluate the joint space as well as to rule out additional cartilage injury as well as meniscal injury.  I, Dr. Loredo, personally performed the evaluation and management services for this established patient who presents today with an orthopedic condition. The evaluation and management includes conducting the conditions and establishing a plan of care.  Today, my ACP Samson PARK, was here to assist with the patient in my evaluation and management services for this condition which will be followed going forward.  35 minutes were spent, face to face, in direct consultation with the patient. This includes reviewing the natural history of their Dx., eliciting the history, performing an orthopedic exam, review of the x-ray findings, forming a differential Dx and discussing all treatment options. This Includes both surgical and non-surgical treatments. I also reviewed all the risks and benefits of non-operative & operative Tx options, future impact into orthopedic functions/problems, activity restrictions both at home and at work, and all follow up requirements.

## 2024-03-27 NOTE — END OF VISIT
[Time Spent: ___ minutes] : I have spent [unfilled] minutes of time on the encounter. [FreeTextEntry3] : I, Dr. Aubrey Loredo MD, personally performed the evaluation and management services for this established patient who presented today with a new problem/exacerbation of an existing condition. That E/M includes conducting the examination, assessing all new/exacerbated conditions, and establishing a new plan of care. Today, MY ACP was here to assist with recording the history in my evaluation and management services of this new problem/exacerbated condition to be followed going forward.

## 2024-06-13 ENCOUNTER — APPOINTMENT (OUTPATIENT)
Dept: INTERNAL MEDICINE | Facility: CLINIC | Age: 69
End: 2024-06-13
Payer: MEDICARE

## 2024-06-13 VITALS
HEART RATE: 64 BPM | HEIGHT: 69 IN | TEMPERATURE: 98.1 F | DIASTOLIC BLOOD PRESSURE: 72 MMHG | BODY MASS INDEX: 25.18 KG/M2 | OXYGEN SATURATION: 98 % | SYSTOLIC BLOOD PRESSURE: 132 MMHG | WEIGHT: 170 LBS

## 2024-06-13 DIAGNOSIS — Z00.00 ENCOUNTER FOR GENERAL ADULT MEDICAL EXAMINATION W/OUT ABNORMAL FINDINGS: ICD-10-CM

## 2024-06-13 DIAGNOSIS — E78.5 HYPERLIPIDEMIA, UNSPECIFIED: ICD-10-CM

## 2024-06-13 DIAGNOSIS — M25.512 PAIN IN LEFT SHOULDER: ICD-10-CM

## 2024-06-13 PROCEDURE — G2211 COMPLEX E/M VISIT ADD ON: CPT

## 2024-06-13 PROCEDURE — 99214 OFFICE O/P EST MOD 30 MIN: CPT

## 2024-06-13 NOTE — PHYSICAL EXAM
[TextEntry] : General: NAD Musculoskeletal: good ROM of left shoulder, limited due to pain.  Neuro: aaox3, 5/5 motor strength in all 4 extremities. normal sensation, normal gait.

## 2024-06-13 NOTE — ASSESSMENT
[FreeTextEntry1] : 1.Acute left shoulder pain: given referral to [facility], take nsaids or tylenol as needed.   2.HM: return for CPE, advised bw prior to apt.

## 2024-06-13 NOTE — HISTORY OF PRESENT ILLNESS
[FreeTextEntry1] :  Follow Up Visit [de-identified] : GA HARO is a 69 year M who comes in for a follow up visit. Patient notes he has been exercising for the past few months doing HIIT exercises, shoulder exercises with pushups and strength training. He recently had to stop because of left shoulder pain for many months. He notes pain is on/off, but now its daily, dull aching pain. He tried to look up exercises online but it hasn't helped. No other injury or . He went to [facility] for his right shoulder pain last year and got massage therapy and it greatly helped his symptoms.  Patient denies any cp, sob, abdominal pain, nausea, vomiting, palpitations, fever, chills, constipation, diarrhea.

## 2024-10-04 LAB
25(OH)D3 SERPL-MCNC: 33.9 NG/ML
ALBUMIN SERPL ELPH-MCNC: 4.3 G/DL
ALP BLD-CCNC: 117 U/L
ALT SERPL-CCNC: 35 U/L
ANION GAP SERPL CALC-SCNC: 13 MMOL/L
APPEARANCE: CLEAR
AST SERPL-CCNC: 24 U/L
BACTERIA: NEGATIVE /HPF
BASOPHILS # BLD AUTO: 0.06 K/UL
BASOPHILS NFR BLD AUTO: 1 %
BILIRUB SERPL-MCNC: 0.6 MG/DL
BILIRUBIN URINE: NEGATIVE
BLOOD URINE: NEGATIVE
BUN SERPL-MCNC: 18 MG/DL
CALCIUM OXALATE CRYSTALS: PRESENT
CALCIUM SERPL-MCNC: 9.6 MG/DL
CAST: 3 /LPF
CHLORIDE SERPL-SCNC: 101 MMOL/L
CHOLEST SERPL-MCNC: 145 MG/DL
CO2 SERPL-SCNC: 23 MMOL/L
COLOR: NORMAL
CREAT SERPL-MCNC: 1.02 MG/DL
EGFR: 81 ML/MIN/1.73M2
EOSINOPHIL # BLD AUTO: 0.7 K/UL
EOSINOPHIL NFR BLD AUTO: 11.1 %
EPITHELIAL CELLS: 1 /HPF
ESTIMATED AVERAGE GLUCOSE: 117 MG/DL
GLUCOSE QUALITATIVE U: NEGATIVE MG/DL
GLUCOSE SERPL-MCNC: 100 MG/DL
HBA1C MFR BLD HPLC: 5.7 %
HCT VFR BLD CALC: 42.1 %
HDLC SERPL-MCNC: 42 MG/DL
HGB BLD-MCNC: 14.3 G/DL
IMM GRANULOCYTES NFR BLD AUTO: 0.3 %
IRON SERPL-MCNC: 80 UG/DL
KETONES URINE: ABNORMAL MG/DL
LDLC SERPL CALC-MCNC: 84 MG/DL
LEUKOCYTE ESTERASE URINE: NEGATIVE
LYMPHOCYTES # BLD AUTO: 1.19 K/UL
LYMPHOCYTES NFR BLD AUTO: 18.9 %
MAN DIFF?: NORMAL
MCHC RBC-ENTMCNC: 31 PG
MCHC RBC-ENTMCNC: 34 GM/DL
MCV RBC AUTO: 91.1 FL
MICROSCOPIC-UA: NORMAL
MONOCYTES # BLD AUTO: 1.04 K/UL
MONOCYTES NFR BLD AUTO: 16.5 %
MUCUS: PRESENT
NEUTROPHILS # BLD AUTO: 3.29 K/UL
NEUTROPHILS NFR BLD AUTO: 52.2 %
NITRITE URINE: NEGATIVE
NONHDLC SERPL-MCNC: 103 MG/DL
PH URINE: 6
PLATELET # BLD AUTO: 216 K/UL
POTASSIUM SERPL-SCNC: 4.2 MMOL/L
PROT SERPL-MCNC: 6.4 G/DL
PROTEIN URINE: NORMAL MG/DL
PSA SERPL-MCNC: 0.41 NG/ML
RBC # BLD: 4.62 M/UL
RBC # FLD: 13.3 %
RED BLOOD CELLS URINE: 1 /HPF
REVIEW: NORMAL
SODIUM SERPL-SCNC: 138 MMOL/L
SPECIFIC GRAVITY URINE: >1.03
TRIGL SERPL-MCNC: 100 MG/DL
TSH SERPL-ACNC: 3.13 UIU/ML
UROBILINOGEN URINE: 0.2 MG/DL
WBC # FLD AUTO: 6.3 K/UL
WHITE BLOOD CELLS URINE: 0 /HPF

## 2024-10-10 ENCOUNTER — APPOINTMENT (OUTPATIENT)
Dept: INTERNAL MEDICINE | Facility: CLINIC | Age: 69
End: 2024-10-10
Payer: MEDICARE

## 2024-10-10 VITALS
OXYGEN SATURATION: 97 % | SYSTOLIC BLOOD PRESSURE: 118 MMHG | HEIGHT: 69 IN | DIASTOLIC BLOOD PRESSURE: 68 MMHG | TEMPERATURE: 98.2 F | BODY MASS INDEX: 25.18 KG/M2 | WEIGHT: 170 LBS | HEART RATE: 62 BPM

## 2024-10-10 DIAGNOSIS — K41.90 UNILATERAL FEMORAL HERNIA, W/OUT OBSTRUCTION OR GANGRENE, NOT SPECIFIED AS RECURRENT: ICD-10-CM

## 2024-10-10 DIAGNOSIS — Z80.0 FAMILY HISTORY OF MALIGNANT NEOPLASM OF DIGESTIVE ORGANS: ICD-10-CM

## 2024-10-10 DIAGNOSIS — R73.03 PREDIABETES.: ICD-10-CM

## 2024-10-10 DIAGNOSIS — Z00.00 ENCOUNTER FOR GENERAL ADULT MEDICAL EXAMINATION W/OUT ABNORMAL FINDINGS: ICD-10-CM

## 2024-10-10 DIAGNOSIS — E78.5 HYPERLIPIDEMIA, UNSPECIFIED: ICD-10-CM

## 2024-10-10 PROCEDURE — 90677 PCV20 VACCINE IM: CPT

## 2024-10-10 PROCEDURE — G0009: CPT

## 2024-10-10 PROCEDURE — 99397 PER PM REEVAL EST PAT 65+ YR: CPT

## 2024-10-23 ENCOUNTER — APPOINTMENT (OUTPATIENT)
Dept: ULTRASOUND IMAGING | Facility: CLINIC | Age: 69
End: 2024-10-23
Payer: MEDICARE

## 2024-10-23 PROCEDURE — 76857 US EXAM PELVIC LIMITED: CPT | Mod: 26

## 2025-01-06 ENCOUNTER — RX RENEWAL (OUTPATIENT)
Age: 70
End: 2025-01-06

## 2025-01-07 ENCOUNTER — APPOINTMENT (OUTPATIENT)
Dept: ORTHOPEDIC SURGERY | Facility: CLINIC | Age: 70
End: 2025-01-07
Payer: MEDICARE

## 2025-01-07 DIAGNOSIS — Z96.641 PRESENCE OF RIGHT ARTIFICIAL HIP JOINT: ICD-10-CM

## 2025-01-07 PROCEDURE — 99214 OFFICE O/P EST MOD 30 MIN: CPT

## 2025-01-07 PROCEDURE — 73502 X-RAY EXAM HIP UNI 2-3 VIEWS: CPT

## 2025-02-07 ENCOUNTER — APPOINTMENT (OUTPATIENT)
Dept: MRI IMAGING | Facility: CLINIC | Age: 70
End: 2025-02-07

## 2025-02-07 ENCOUNTER — OUTPATIENT (OUTPATIENT)
Dept: OUTPATIENT SERVICES | Facility: HOSPITAL | Age: 70
LOS: 1 days | End: 2025-02-07
Payer: MEDICARE

## 2025-02-07 DIAGNOSIS — Z96.641 PRESENCE OF RIGHT ARTIFICIAL HIP JOINT: Chronic | ICD-10-CM

## 2025-02-07 DIAGNOSIS — Z98.890 OTHER SPECIFIED POSTPROCEDURAL STATES: Chronic | ICD-10-CM

## 2025-02-07 DIAGNOSIS — Z96.641 PRESENCE OF RIGHT ARTIFICIAL HIP JOINT: ICD-10-CM

## 2025-02-07 DIAGNOSIS — Z90.89 ACQUIRED ABSENCE OF OTHER ORGANS: Chronic | ICD-10-CM

## 2025-02-07 PROCEDURE — 73721 MRI JNT OF LWR EXTRE W/O DYE: CPT

## 2025-02-07 PROCEDURE — 73721 MRI JNT OF LWR EXTRE W/O DYE: CPT | Mod: 26,RT

## 2025-02-13 ENCOUNTER — NON-APPOINTMENT (OUTPATIENT)
Age: 70
End: 2025-02-13

## 2025-02-19 ENCOUNTER — NON-APPOINTMENT (OUTPATIENT)
Age: 70
End: 2025-02-19

## 2025-02-28 ENCOUNTER — APPOINTMENT (OUTPATIENT)
Dept: ORTHOPEDIC SURGERY | Facility: CLINIC | Age: 70
End: 2025-02-28
Payer: MEDICARE

## 2025-02-28 DIAGNOSIS — Z96.641 PRESENCE OF RIGHT ARTIFICIAL HIP JOINT: ICD-10-CM

## 2025-02-28 PROCEDURE — 99214 OFFICE O/P EST MOD 30 MIN: CPT

## 2025-03-21 ENCOUNTER — APPOINTMENT (OUTPATIENT)
Dept: INTERNAL MEDICINE | Facility: CLINIC | Age: 70
End: 2025-03-21
Payer: MEDICARE

## 2025-03-21 ENCOUNTER — NON-APPOINTMENT (OUTPATIENT)
Age: 70
End: 2025-03-21

## 2025-03-21 VITALS
OXYGEN SATURATION: 97 % | HEIGHT: 69 IN | WEIGHT: 169 LBS | DIASTOLIC BLOOD PRESSURE: 60 MMHG | SYSTOLIC BLOOD PRESSURE: 92 MMHG | BODY MASS INDEX: 25.03 KG/M2 | TEMPERATURE: 97.6 F | HEART RATE: 64 BPM

## 2025-03-21 DIAGNOSIS — F32.5 MAJOR DEPRESSIVE DISORDER, SINGLE EPISODE, IN FULL REMISSION: ICD-10-CM

## 2025-03-21 DIAGNOSIS — E78.5 HYPERLIPIDEMIA, UNSPECIFIED: ICD-10-CM

## 2025-03-21 DIAGNOSIS — R73.03 PREDIABETES.: ICD-10-CM

## 2025-03-21 DIAGNOSIS — H81.10 BENIGN PAROXYSMAL VERTIGO, UNSPECIFIED EAR: ICD-10-CM

## 2025-03-21 DIAGNOSIS — M79.673 PAIN IN UNSPECIFIED FOOT: ICD-10-CM

## 2025-03-21 DIAGNOSIS — L65.9 NONSCARRING HAIR LOSS, UNSPECIFIED: ICD-10-CM

## 2025-03-21 DIAGNOSIS — K21.9 GASTRO-ESOPHAGEAL REFLUX DISEASE W/OUT ESOPHAGITIS: ICD-10-CM

## 2025-03-21 DIAGNOSIS — G47.33 OBSTRUCTIVE SLEEP APNEA (ADULT) (PEDIATRIC): ICD-10-CM

## 2025-03-21 LAB
25(OH)D3 SERPL-MCNC: 31.3 NG/ML
ALBUMIN SERPL ELPH-MCNC: 4.4 G/DL
ALP BLD-CCNC: 131 U/L
ALT SERPL-CCNC: 28 U/L
ANION GAP SERPL CALC-SCNC: 9 MMOL/L
AST SERPL-CCNC: 19 U/L
BILIRUB SERPL-MCNC: 0.4 MG/DL
BUN SERPL-MCNC: 15 MG/DL
CALCIUM SERPL-MCNC: 9.5 MG/DL
CHLORIDE SERPL-SCNC: 106 MMOL/L
CHOLEST SERPL-MCNC: 156 MG/DL
CO2 SERPL-SCNC: 27 MMOL/L
CREAT SERPL-MCNC: 0.91 MG/DL
EGFRCR SERPLBLD CKD-EPI 2021: 92 ML/MIN/1.73M2
ESTIMATED AVERAGE GLUCOSE: 111 MG/DL
GLUCOSE SERPL-MCNC: 110 MG/DL
HBA1C MFR BLD HPLC: 5.5 %
HCT VFR BLD CALC: 45.1 %
HCV AB SER QL: NONREACTIVE
HCV S/CO RATIO: 0.05 S/CO
HDLC SERPL-MCNC: 39 MG/DL
HGB BLD-MCNC: 15.2 G/DL
LDLC SERPL CALC-MCNC: 95 MG/DL
MCHC RBC-ENTMCNC: 31.1 PG
MCHC RBC-ENTMCNC: 33.7 G/DL
MCV RBC AUTO: 92.4 FL
NONHDLC SERPL-MCNC: 118 MG/DL
PLATELET # BLD AUTO: 203 K/UL
POTASSIUM SERPL-SCNC: 5.2 MMOL/L
PROT SERPL-MCNC: 6.6 G/DL
PSA FREE FLD-MCNC: 56 %
PSA FREE SERPL-MCNC: 0.25 NG/ML
PSA SERPL-MCNC: 0.44 NG/ML
RBC # BLD: 4.88 M/UL
RBC # FLD: 13.7 %
SODIUM SERPL-SCNC: 143 MMOL/L
TRIGL SERPL-MCNC: 124 MG/DL
TSH SERPL-ACNC: 2.79 UIU/ML
WBC # FLD AUTO: 5.72 K/UL

## 2025-03-21 PROCEDURE — G0438: CPT

## 2025-03-21 PROCEDURE — 99213 OFFICE O/P EST LOW 20 MIN: CPT | Mod: 25

## 2025-03-21 PROCEDURE — G0403: CPT

## 2025-04-03 ENCOUNTER — APPOINTMENT (OUTPATIENT)
Age: 70
End: 2025-04-03
Payer: MEDICARE

## 2025-04-03 DIAGNOSIS — M72.2 PLANTAR FASCIAL FIBROMATOSIS: ICD-10-CM

## 2025-04-03 DIAGNOSIS — R73.03 PREDIABETES.: ICD-10-CM

## 2025-04-03 PROCEDURE — 99204 OFFICE O/P NEW MOD 45 MIN: CPT | Mod: 25

## 2025-04-03 PROCEDURE — 29540 STRAPPING ANKLE &/FOOT: CPT | Mod: RT

## 2025-04-03 RX ORDER — CELECOXIB 200 MG/1
200 CAPSULE ORAL
Qty: 40 | Refills: 1 | Status: ACTIVE | COMMUNITY
Start: 2025-04-03 | End: 1900-01-01

## 2025-04-10 ENCOUNTER — APPOINTMENT (OUTPATIENT)
Dept: INTERNAL MEDICINE | Facility: CLINIC | Age: 70
End: 2025-04-10

## 2025-04-24 ENCOUNTER — APPOINTMENT (OUTPATIENT)
Age: 70
End: 2025-04-24

## 2025-07-10 ENCOUNTER — APPOINTMENT (OUTPATIENT)
Dept: DERMATOLOGY | Facility: CLINIC | Age: 70
End: 2025-07-10

## 2025-07-23 ENCOUNTER — APPOINTMENT (OUTPATIENT)
Dept: DERMATOLOGY | Facility: CLINIC | Age: 70
End: 2025-07-23

## (undated) DEVICE — VENODYNE/SCD SLEEVE CALF MEDIUM

## (undated) DEVICE — NDL SPINAL 18G X 3.5" (PINK)

## (undated) DEVICE — WARMING BLANKET UPPER ADULT

## (undated) DEVICE — TUBING CONNECTING 6MM 20FT

## (undated) DEVICE — SHAVER BLADE GREAT WHITE 4.2MM 15DEG BENDABLE

## (undated) DEVICE — GLV 7.5 PROTEXIS (WHITE)

## (undated) DEVICE — DVC SUCTION FLR PUDDLE GUPPY

## (undated) DEVICE — PACK KNEE ARTHROSCOPY

## (undated) DEVICE — S&N ARTHROCARE WAND COBLATION WEREWOLF FLOW 50

## (undated) DEVICE — TUBING SET GRAVITY 4 SPIKE

## (undated) DEVICE — CANISTER SUCTION LINER 1500 CC

## (undated) DEVICE — CANNULA LINVATEC SHOULDER 7CM (GREY & ORANGE)

## (undated) DEVICE — SOLIDIFIER CANN EXPRESS 3K

## (undated) DEVICE — POSITIONER STIRRUP STRAP W SLIP RING 19X3.5"

## (undated) DEVICE — S&N ARTHROCARE WAND 60 DEGREE

## (undated) DEVICE — SOL IRR BAG NS 0.9% 3000ML

## (undated) DEVICE — TUBING SUCTION 20FT

## (undated) DEVICE — SYM-ARTHROSCOPY SHAVER: Type: DURABLE MEDICAL EQUIPMENT

## (undated) DEVICE — SUT MONOSOF 3-0 18" C-14

## (undated) DEVICE — CANISTER SUCTION LID GUARD 3000CC

## (undated) DEVICE — NDL HYPO SAFE 20G X 1" (YELLOW)

## (undated) DEVICE — POSITIONER FOAM HEAD CRADLE (PINK)

## (undated) DEVICE — GLV 8 PROTEXIS (BLUE)

## (undated) DEVICE — POSITIONER STRAP ARMBOARD VELCRO TS-30